# Patient Record
Sex: MALE | Race: WHITE
[De-identification: names, ages, dates, MRNs, and addresses within clinical notes are randomized per-mention and may not be internally consistent; named-entity substitution may affect disease eponyms.]

---

## 2017-12-02 ENCOUNTER — HOSPITAL ENCOUNTER (EMERGENCY)
Dept: HOSPITAL 17 - NEPC | Age: 67
Discharge: NURSING, REHAB OR LONG-TERM CARE | End: 2017-12-02
Payer: MEDICARE

## 2017-12-02 VITALS
TEMPERATURE: 98.6 F | DIASTOLIC BLOOD PRESSURE: 102 MMHG | RESPIRATION RATE: 16 BRPM | SYSTOLIC BLOOD PRESSURE: 180 MMHG | OXYGEN SATURATION: 99 % | HEART RATE: 93 BPM

## 2017-12-02 VITALS
RESPIRATION RATE: 16 BRPM | OXYGEN SATURATION: 97 % | SYSTOLIC BLOOD PRESSURE: 167 MMHG | HEART RATE: 94 BPM | DIASTOLIC BLOOD PRESSURE: 98 MMHG

## 2017-12-02 VITALS — BODY MASS INDEX: 24.39 KG/M2 | HEIGHT: 68 IN | WEIGHT: 160.94 LBS

## 2017-12-02 DIAGNOSIS — Z86.69: ICD-10-CM

## 2017-12-02 DIAGNOSIS — R10.9: Primary | ICD-10-CM

## 2017-12-02 DIAGNOSIS — Z87.19: ICD-10-CM

## 2017-12-02 DIAGNOSIS — N28.9: ICD-10-CM

## 2017-12-02 DIAGNOSIS — Z87.39: ICD-10-CM

## 2017-12-02 DIAGNOSIS — R11.10: ICD-10-CM

## 2017-12-02 DIAGNOSIS — F03.90: ICD-10-CM

## 2017-12-02 LAB
ALP SERPL-CCNC: 150 U/L (ref 45–117)
ALT SERPL-CCNC: 25 U/L (ref 12–78)
ANION GAP SERPL CALC-SCNC: 5 MEQ/L (ref 5–15)
AST SERPL-CCNC: 34 U/L (ref 15–37)
BASOPHILS # BLD AUTO: 0.1 TH/MM3 (ref 0–0.2)
BASOPHILS NFR BLD: 0.8 % (ref 0–2)
BILIRUB SERPL-MCNC: 0.1 MG/DL (ref 0.2–1)
BUN SERPL-MCNC: 21 MG/DL (ref 7–18)
CHLORIDE SERPL-SCNC: 106 MEQ/L (ref 98–107)
COLOR UR: (no result)
COMMENT (UR): (no result)
CULTURE IF INDICATED: (no result)
EOSINOPHIL # BLD: 0.2 TH/MM3 (ref 0–0.4)
EOSINOPHIL NFR BLD: 1.7 % (ref 0–4)
ERYTHROCYTE [DISTWIDTH] IN BLOOD BY AUTOMATED COUNT: 16.4 % (ref 11.6–17.2)
GFR SERPLBLD BASED ON 1.73 SQ M-ARVRAT: 40 ML/MIN (ref 89–?)
GLUCOSE UR STRIP-MCNC: (no result) MG/DL
HCO3 BLD-SCNC: 29.9 MEQ/L (ref 21–32)
HCT VFR BLD CALC: 37.4 % (ref 39–51)
HEMO FLAGS: (no result)
HGB UR QL STRIP: (no result)
KETONES UR STRIP-MCNC: (no result) MG/DL
LYMPHOCYTES # BLD AUTO: 2.3 TH/MM3 (ref 1–4.8)
LYMPHOCYTES NFR BLD AUTO: 23.7 % (ref 9–44)
MCH RBC QN AUTO: 29 PG (ref 27–34)
MCHC RBC AUTO-ENTMCNC: 32.7 % (ref 32–36)
MCV RBC AUTO: 88.7 FL (ref 80–100)
MONOCYTES NFR BLD: 12.3 % (ref 0–8)
MUCOUS THREADS #/AREA URNS LPF: (no result) /LPF
NEUTROPHILS # BLD AUTO: 6 TH/MM3 (ref 1.8–7.7)
NEUTROPHILS NFR BLD AUTO: 61.5 % (ref 16–70)
NITRITE UR QL STRIP: (no result)
PLATELET # BLD: 321 TH/MM3 (ref 150–450)
POTASSIUM SERPL-SCNC: 3.9 MEQ/L (ref 3.5–5.1)
RBC # BLD AUTO: 4.22 MIL/MM3 (ref 4.5–5.9)
SODIUM SERPL-SCNC: 141 MEQ/L (ref 136–145)
SP GR UR STRIP: 1.01 (ref 1–1.03)
WBC # BLD AUTO: 9.7 TH/MM3 (ref 4–11)

## 2017-12-02 PROCEDURE — 96360 HYDRATION IV INFUSION INIT: CPT

## 2017-12-02 PROCEDURE — 83690 ASSAY OF LIPASE: CPT

## 2017-12-02 PROCEDURE — 74000: CPT

## 2017-12-02 PROCEDURE — 96361 HYDRATE IV INFUSION ADD-ON: CPT

## 2017-12-02 PROCEDURE — 99285 EMERGENCY DEPT VISIT HI MDM: CPT

## 2017-12-02 PROCEDURE — 85025 COMPLETE CBC W/AUTO DIFF WBC: CPT

## 2017-12-02 PROCEDURE — 81001 URINALYSIS AUTO W/SCOPE: CPT

## 2017-12-02 PROCEDURE — 74176 CT ABD & PELVIS W/O CONTRAST: CPT

## 2017-12-02 PROCEDURE — 80053 COMPREHEN METABOLIC PANEL: CPT

## 2017-12-02 NOTE — RADRPT
EXAM DATE/TIME:  12/02/2017 03:34 

 

[facility] COMPARISON:     

CT ABDOMEN & PELVIS W/O CONTRAST, September 01, 2017, 21:57.

 

 

INDICATIONS :     

Right lower quadrant pain with nausea and vomiting. Evaluate for obstruction.

                  

 

ORAL CONTRAST:      

No oral contrast ingested.

                  

 

RADIATION DOSE:     

4.61 CTDIvol (mGy) 

 

 

MEDICAL HISTORY :     

None  

 

SURGICAL HISTORY :       

Left hip surgery. Gastric surgery.

 

ENCOUNTER:      

Initial

 

ACUITY:      

1 day

 

PAIN SCALE:      

5/10

 

LOCATION:       

Bilateral  abdomen

 

TECHNIQUE:     

Volumetric scanning of the abdomen and pelvis was performed.  Using automated exposure control and ad
justment of the mA and/or kV according to patient size, radiation dose was kept as low as reasonably 
achievable to obtain optimal diagnostic quality images.  DICOM format image data is available electro
nically for review and comparison.  

 

FINDINGS:     

 

LOWER LUNGS:     

There is atelectasis and likely trace fluid at the left lung base.

 

LIVER:     

Homogeneous density without lesion.  There is no dilation of the biliary tree.  There are multiple cl
ips in gallbladder fossa.

 

SPLEEN:     

Normal size without lesion.

 

PANCREAS:     

Within normal limits. 

 

KIDNEYS:     

Normal in size and shape.  There is no mass, stone, or hydronephrosis.

 

ADRENAL GLANDS:     

Within normal limits.

 

VASCULAR:     

There is no aortic aneurysm. There is moderate atherosclerotic disease.

 

BOWEL/MESENTERY:     

Patient is post esophagectomy with gastric pull up procedure. A portion of the distal transverse and 
splenic flexure of the colon is present within the left inferior hemithorax related to hernia. The sm
all bowel demonstrates no significant abnormality. There is no free air or free fluid.

 

ABDOMINAL WALL:     

Within normal limits.

 

RETROPERITONEUM:     

There is no lymphadenopathy.

 

BLADDER:     

No wall thickening or mass.

 

REPRODUCTIVE:     

Within normal limits.

 

INGUINAL:     

There is no lymphadenopathy or hernia.

 

MUSCULOSKELETAL:     

There is intramedullary morelia in the left proximal femur. No acute osseous abnormality is seen.

 

CONCLUSION:     

1. No acute finding is identified within the abdomen or pelvis. There are no findings to indicate obs
truction.

2. Postsurgical changes indicative of prior esophagectomy and gastric pull up procedure and stable he
rnia of the colon.

 

 

 

 John Loera MD on December 02, 2017 at 4:11           

Board Certified Radiologist.

 This report was verified electronically.

## 2017-12-02 NOTE — PD
HPI


Chief Complaint:  Abdominal Pain


Time Seen by Provider:  02:41


Travel History


International Travel<30 days:  No


Contact w/Intl Traveler<30days:  No


Traveled to known affect area:  No





History of Present Illness


HPI


68 y/o male presents with abdominal pain and vomiting of unknown duration.  The 

nursing home that he stays at ordered a x-ray today that showed a possible 

small bowel obstruction and he was sent here for further evaluation.  Patient 

confirms he has pain but cannot give me any other details and history is 

limited.





PFSH


Past Medical History


*** Narrative Medical


By records


Asthma:  No


Blood Disorders:  No


Anxiety:  No


Depression:  No


Heart Rhythm Problems:  No


Cancer:  No


Cardiovascular Problems:  No


High Cholesterol:  No


Chemotherapy:  No


Chest Pain:  No


Congestive Heart Failure:  No


COPD:  No


Dementia:  Yes


Diabetes:  No


Endocrine:  No


Gastrointestinal Disorders:  Yes (hx of gastric surgery)


Genitourinary:  No


Immune Disorder:  No


Musculoskeletal:  Yes (,RIGHT HIP REPLACEMENT IN APRIL)


Neurologic:  Yes (weak in legs)


Psychiatric:  Yes (dementia)


Reproductive:  No


Respiratory:  No


Radiation Therapy:  No


Sleep Apnea:  No


Thyroid Disease:  No





Past Surgical History


*** Narrative Surgical


By records


Abdominal Surgery:  Yes


Pacemaker:  No


Other Surgery:  Yes (gastric surgery)





Social History


Alcohol Use:  No


Tobacco Use:  No


Substance Use:  No





Allergies-Medications


(Allergen,Severity, Reaction):  


Coded Allergies:  


     Sulfa (Sulfonamide Antibiotics) (Unverified  Allergy, Unknown, 11/18/17)


     penicillin G (Unverified  Allergy, Unknown, 11/18/17)


     prochlorperazine (Unverified  Allergy, Unknown, 11/18/17)


Reported Meds & Prescriptions





Reported Meds & Active Scripts


Active


Doxycycline Hyclate 100 Mg Cap 100 Mg PO BID 6 Days


Bisac-Evac Supp (Bisacodyl) 10 Mg Supp 10 Mg RECTAL DAILY PRN


Aspirin Low Dose (Aspirin) 81 Mg Chew 81 Mg CHEW DAILY


Abilify (Aripiprazole) 2 Mg Tab 2 Mg PO DAILY@0600


Namenda (Memantine) 10 Mg Tab 10 Mg PO BID


Venlafaxine ER 24 HR (Venlafaxine HCl) 150 Mg Cap 150 Mg PO HS


Reported


Senna-Tabs (Sennosides) 8.6 Mg Tab 8.6 Mg PO BID


Vitamin D3 (Cholecalciferol) 1,000 Unit Cap 1,000 Units PO DAILY


Integra (Multi-Vit/Iron-B Comp-Vit C) Unknown Strength Cap Unknown Dose  








Review of Systems


ROS Limitations:  Poor Historian





Physical Exam


Exam Limitations:  Poor Historian


Narrative


GENERAL: Well-nourished, well-developed patient.


SKIN: Warm and dry.


HEAD: Normocephalic 


EYES: No injection or drainage. 


ENT: No nasal drainage noted. 


NECK: Supple, trachea midline.


CARDIOVASCULAR: Regular rate and rhythm 


RESPIRATORY: No increased effort. No accessory muscle use.


GASTROINTESTINAL: Abdomen diffusely tender, nondistended. 


NEUROLOGICAL: Awake, moves all extremities





Data


Data


Last Documented VS





Vital Signs








  Date Time  Temp Pulse Resp B/P (MAP) Pulse Ox O2 Delivery O2 Flow Rate FiO2


 


12/2/17 02:44 98.6 93 16 180/102 (128) 99   








Orders





 Orders


Complete Blood Count With Diff (12/2/17 02:41)


Comprehensive Metabolic Panel (12/2/17 02:41)


Lipase (12/2/17 02:41)


Iv Access Insert/Monitor (12/2/17 02:41)


Abdomen, Kub Only (12/2/17 )


Ct Abd/Pel W/O Iv Contrast (12/2/17 )


Urinalysis - C+S If Indicated (12/2/17 04:20)


Sodium Chlor 0.9% 1000 Ml Inj (Ns 1000 M (12/2/17 04:30)


Ed Discharge Order (12/2/17 05:29)





Labs





Laboratory Tests








Test


  12/2/17


02:45 12/2/17


05:00


 


White Blood Count 9.7 TH/MM3  


 


Red Blood Count 4.22 MIL/MM3  


 


Hemoglobin 12.2 GM/DL  


 


Hematocrit 37.4 %  


 


Mean Corpuscular Volume 88.7 FL  


 


Mean Corpuscular Hemoglobin 29.0 PG  


 


Mean Corpuscular Hemoglobin


Concent 32.7 % 


  


 


 


Red Cell Distribution Width 16.4 %  


 


Platelet Count 321 TH/MM3  


 


Mean Platelet Volume 6.8 FL  


 


Neutrophils (%) (Auto) 61.5 %  


 


Lymphocytes (%) (Auto) 23.7 %  


 


Monocytes (%) (Auto) 12.3 %  


 


Eosinophils (%) (Auto) 1.7 %  


 


Basophils (%) (Auto) 0.8 %  


 


Neutrophils # (Auto) 6.0 TH/MM3  


 


Lymphocytes # (Auto) 2.3 TH/MM3  


 


Monocytes # (Auto) 1.2 TH/MM3  


 


Eosinophils # (Auto) 0.2 TH/MM3  


 


Basophils # (Auto) 0.1 TH/MM3  


 


CBC Comment DIFF FINAL  


 


Differential Comment   


 


Blood Urea Nitrogen 21 MG/DL  


 


Creatinine 1.73 MG/DL  


 


Random Glucose 107 MG/DL  


 


Total Protein 7.2 GM/DL  


 


Albumin 3.2 GM/DL  


 


Calcium Level 8.9 MG/DL  


 


Alkaline Phosphatase 150 U/L  


 


Aspartate Amino Transf


(AST/SGOT) 34 U/L 


  


 


 


Alanine Aminotransferase


(ALT/SGPT) 25 U/L 


  


 


 


Total Bilirubin 0.1 MG/DL  


 


Sodium Level 141 MEQ/L  


 


Potassium Level 3.9 MEQ/L  


 


Chloride Level 106 MEQ/L  


 


Carbon Dioxide Level 29.9 MEQ/L  


 


Anion Gap 5 MEQ/L  


 


Estimat Glomerular Filtration


Rate 40 ML/MIN 


  


 


 


Lipase 191 U/L  


 


Urine Color  LIGHT-YELLOW 


 


Urine Turbidity  CLEAR 


 


Urine pH  7.0 


 


Urine Specific Gravity  1.009 


 


Urine Protein  TRACE mg/dL 


 


Urine Glucose (UA)  NEG mg/dL 


 


Urine Ketones  NEG mg/dL 


 


Urine Occult Blood  NEG 


 


Urine Nitrite  NEG 


 


Urine Bilirubin  NEG 


 


Urine Urobilinogen


  


  LESS THAN 2.0


MG/DL


 


Urine Leukocyte Esterase  NEG 


 


Urine WBC


  


  LESS THAN 1


/hpf


 


Urine Mucus  FEW /lpf 


 


Microscopic Urinalysis Comment


  


  CATH-CULT NOT


IND











MDM


Medical Decision Making


Medical Screen Exam Complete:  Yes


Emergency Medical Condition:  Yes


Medical Record Reviewed:  Yes (past history confirmed)


Interpretation(s)


CBC & BMP Diagram


12/2/17 02:45








Total Protein 7.2, Albumin 3.2 L, Calcium Level 8.9, Alkaline Phosphatase 150 H

, Aspartate Amino Transf (AST/SGOT) 34, Alanine Aminotransferase (ALT/SGPT) 25, 

Total Bilirubin 0.1 L








Last 24 hours Impressions








Abdomen X-Ray 12/2/17 0000 Signed





Impressions: 





 Service Date/Time:  Saturday, December 2, 2017 02:50 - CONCLUSION:  No acute 





 finding is identified.     John Loera MD 








Differential Diagnosis


Obstruction, ileus, pancreatitis, gastritis


Narrative Course


Will check blood work, x-ray and reevaluate





X-ray nondiagnostic will proceed with CT





Labs show renal insufficiencies, CT is nondiagnostic, will add on urinalysis





UA no signs of infection, given IV fluids, no emesis here, patient can follow-

up outpatient for repeat creatinine and will be given Zofran.





Diagnosis





 Primary Impression:  


 Abdominal pain


 Qualified Codes:  R10.9 - Unspecified abdominal pain


 Additional Impressions:  


 Vomiting


 Qualified Codes:  R11.10 - Vomiting, unspecified


 Renal insufficiency


Patient Instructions:  General Instructions





***Additional Instructions:  


return as needed, follow with primary monday, tylenol as needed, keep hydrated


***Med/Other Pt SpecificInfo:  No Change to Meds


Disposition:  03 DISCHARGE TO SNF


Condition:  Stable











Cristel Babcock MD Dec 2, 2017 04:19

## 2017-12-02 NOTE — RADRPT
EXAM DATE/TIME:  12/02/2017 02:50 

 

[facility] COMPARISON:     

CT ABDOMEN & PELVIS W/O CONTRAST, September 01, 2017, 21:57.  CHEST SINGLE AP, November 18, 2017, 9:1
8.

 

                     

INDICATIONS :     

Nausea and vomiting x 1 day

                     

 

MEDICAL HISTORY :     

Carcinoma, esophageal.          

 

SURGICAL HISTORY :        

Partial Gastrectomy

 

ENCOUNTER:     

Initial                                        

 

ACUITY:     

1 day      

 

PAIN SCORE:     

8/10

 

LOCATION:     

Bilateral  Abdomen

 

FINDINGS:     

2 frontal supine views of the abdomen demonstrate air within bowel in a nonobstructive pattern. No or
ganomegaly or concerning calcifications are identified. Clips overlie the superior abdomen and inferi
or chest. There is stable left lung base opacity related to a herniated colon and there is a small le
ft pleural effusion. The left proximal femur hardware is present.

 

CONCLUSION:     

No acute finding is identified.

 

 

 

 John Loera MD on December 02, 2017 at 3:07           

Board Certified Radiologist.

 This report was verified electronically.

## 2018-04-27 ENCOUNTER — HOSPITAL ENCOUNTER (OUTPATIENT)
Dept: HOSPITAL 17 - NEPE | Age: 68
Setting detail: OBSERVATION
LOS: 4 days | Discharge: NURSING, REHAB OR LONG-TERM CARE | End: 2018-05-01
Attending: HOSPITALIST | Admitting: HOSPITALIST
Payer: MEDICARE

## 2018-04-27 VITALS
OXYGEN SATURATION: 97 % | RESPIRATION RATE: 18 BRPM | HEART RATE: 76 BPM | SYSTOLIC BLOOD PRESSURE: 152 MMHG | DIASTOLIC BLOOD PRESSURE: 74 MMHG | TEMPERATURE: 98.7 F

## 2018-04-27 VITALS
SYSTOLIC BLOOD PRESSURE: 191 MMHG | TEMPERATURE: 97.9 F | RESPIRATION RATE: 15 BRPM | HEART RATE: 92 BPM | DIASTOLIC BLOOD PRESSURE: 99 MMHG | OXYGEN SATURATION: 98 %

## 2018-04-27 VITALS
RESPIRATION RATE: 19 BRPM | DIASTOLIC BLOOD PRESSURE: 106 MMHG | HEART RATE: 82 BPM | OXYGEN SATURATION: 99 % | SYSTOLIC BLOOD PRESSURE: 190 MMHG | TEMPERATURE: 98.4 F

## 2018-04-27 VITALS
RESPIRATION RATE: 14 BRPM | DIASTOLIC BLOOD PRESSURE: 92 MMHG | OXYGEN SATURATION: 97 % | HEART RATE: 84 BPM | SYSTOLIC BLOOD PRESSURE: 157 MMHG

## 2018-04-27 VITALS — BODY MASS INDEX: 25.25 KG/M2 | WEIGHT: 176.37 LBS | HEIGHT: 70 IN

## 2018-04-27 DIAGNOSIS — Z85.01: ICD-10-CM

## 2018-04-27 DIAGNOSIS — Z85.028: ICD-10-CM

## 2018-04-27 DIAGNOSIS — K20.9: ICD-10-CM

## 2018-04-27 DIAGNOSIS — K44.9: ICD-10-CM

## 2018-04-27 DIAGNOSIS — R47.02: ICD-10-CM

## 2018-04-27 DIAGNOSIS — N17.9: ICD-10-CM

## 2018-04-27 DIAGNOSIS — Z79.899: ICD-10-CM

## 2018-04-27 DIAGNOSIS — K92.2: Primary | ICD-10-CM

## 2018-04-27 DIAGNOSIS — F03.90: ICD-10-CM

## 2018-04-27 DIAGNOSIS — I12.9: ICD-10-CM

## 2018-04-27 DIAGNOSIS — D50.9: ICD-10-CM

## 2018-04-27 DIAGNOSIS — N18.9: ICD-10-CM

## 2018-04-27 DIAGNOSIS — K22.11: ICD-10-CM

## 2018-04-27 LAB
ALBUMIN SERPL-MCNC: 4.2 GM/DL (ref 3.4–5)
ALP SERPL-CCNC: 192 U/L (ref 45–117)
ALT SERPL-CCNC: 26 U/L (ref 12–78)
AST SERPL-CCNC: 33 U/L (ref 15–37)
BASOPHILS # BLD AUTO: 0.1 TH/MM3 (ref 0–0.2)
BASOPHILS NFR BLD: 1.2 % (ref 0–2)
BILIRUB SERPL-MCNC: 0.2 MG/DL (ref 0.2–1)
BUN SERPL-MCNC: 22 MG/DL (ref 7–18)
CALCIUM SERPL-MCNC: 9.7 MG/DL (ref 8.5–10.1)
CHLORIDE SERPL-SCNC: 100 MEQ/L (ref 98–107)
CREAT SERPL-MCNC: 1.92 MG/DL (ref 0.6–1.3)
EOSINOPHIL # BLD: 0.2 TH/MM3 (ref 0–0.4)
EOSINOPHIL NFR BLD: 3.3 % (ref 0–4)
ERYTHROCYTE [DISTWIDTH] IN BLOOD BY AUTOMATED COUNT: 18.2 % (ref 11.6–17.2)
GFR SERPLBLD BASED ON 1.73 SQ M-ARVRAT: 35 ML/MIN (ref 89–?)
GLUCOSE SERPL-MCNC: 101 MG/DL (ref 74–106)
HCO3 BLD-SCNC: 28.4 MEQ/L (ref 21–32)
HCT VFR BLD CALC: 39.3 % (ref 39–51)
HGB BLD-MCNC: 12.6 GM/DL (ref 13–17)
INR PPP: 1 RATIO
LYMPHOCYTES # BLD AUTO: 1.4 TH/MM3 (ref 1–4.8)
LYMPHOCYTES NFR BLD AUTO: 22.9 % (ref 9–44)
MCH RBC QN AUTO: 25.4 PG (ref 27–34)
MCHC RBC AUTO-ENTMCNC: 32.1 % (ref 32–36)
MCV RBC AUTO: 79 FL (ref 80–100)
MONOCYTE #: 0.8 TH/MM3 (ref 0–0.9)
MONOCYTES NFR BLD: 12 % (ref 0–8)
NEUTROPHILS # BLD AUTO: 3.8 TH/MM3 (ref 1.8–7.7)
NEUTROPHILS NFR BLD AUTO: 60.6 % (ref 16–70)
PLATELET # BLD: 408 TH/MM3 (ref 150–450)
PMV BLD AUTO: 7.5 FL (ref 7–11)
PROT SERPL-MCNC: 9 GM/DL (ref 6.4–8.2)
PROTHROMBIN TIME: 9.8 SEC (ref 9.8–11.6)
RBC # BLD AUTO: 4.98 MIL/MM3 (ref 4.5–5.9)
SODIUM SERPL-SCNC: 137 MEQ/L (ref 136–145)
WBC # BLD AUTO: 6.3 TH/MM3 (ref 4–11)

## 2018-04-27 PROCEDURE — 86850 RBC ANTIBODY SCREEN: CPT

## 2018-04-27 PROCEDURE — 81001 URINALYSIS AUTO W/SCOPE: CPT

## 2018-04-27 PROCEDURE — 96365 THER/PROPH/DIAG IV INF INIT: CPT

## 2018-04-27 PROCEDURE — 86901 BLOOD TYPING SEROLOGIC RH(D): CPT

## 2018-04-27 PROCEDURE — C9113 INJ PANTOPRAZOLE SODIUM, VIA: HCPCS

## 2018-04-27 PROCEDURE — 43270 EGD LESION ABLATION: CPT

## 2018-04-27 PROCEDURE — 85610 PROTHROMBIN TIME: CPT

## 2018-04-27 PROCEDURE — 82607 VITAMIN B-12: CPT

## 2018-04-27 PROCEDURE — 99285 EMERGENCY DEPT VISIT HI MDM: CPT

## 2018-04-27 PROCEDURE — 97163 PT EVAL HIGH COMPLEX 45 MIN: CPT

## 2018-04-27 PROCEDURE — 88312 SPECIAL STAINS GROUP 1: CPT

## 2018-04-27 PROCEDURE — 85025 COMPLETE CBC W/AUTO DIFF WBC: CPT

## 2018-04-27 PROCEDURE — 93970 EXTREMITY STUDY: CPT

## 2018-04-27 PROCEDURE — G0378 HOSPITAL OBSERVATION PER HR: HCPCS

## 2018-04-27 PROCEDURE — 86900 BLOOD TYPING SEROLOGIC ABO: CPT

## 2018-04-27 PROCEDURE — 80053 COMPREHEN METABOLIC PANEL: CPT

## 2018-04-27 PROCEDURE — 83540 ASSAY OF IRON: CPT

## 2018-04-27 PROCEDURE — 71045 X-RAY EXAM CHEST 1 VIEW: CPT

## 2018-04-27 PROCEDURE — 00731 ANES UPR GI NDSC PX NOS: CPT

## 2018-04-27 PROCEDURE — 80048 BASIC METABOLIC PNL TOTAL CA: CPT

## 2018-04-27 PROCEDURE — 96375 TX/PRO/DX INJ NEW DRUG ADDON: CPT

## 2018-04-27 PROCEDURE — 82746 ASSAY OF FOLIC ACID SERUM: CPT

## 2018-04-27 PROCEDURE — 85014 HEMATOCRIT: CPT

## 2018-04-27 PROCEDURE — 96361 HYDRATE IV INFUSION ADD-ON: CPT

## 2018-04-27 PROCEDURE — 96366 THER/PROPH/DIAG IV INF ADDON: CPT

## 2018-04-27 PROCEDURE — 88305 TISSUE EXAM BY PATHOLOGIST: CPT

## 2018-04-27 PROCEDURE — 85018 HEMOGLOBIN: CPT

## 2018-04-27 PROCEDURE — 83550 IRON BINDING TEST: CPT

## 2018-04-27 PROCEDURE — 82728 ASSAY OF FERRITIN: CPT

## 2018-04-27 PROCEDURE — 43239 EGD BIOPSY SINGLE/MULTIPLE: CPT

## 2018-04-27 PROCEDURE — 96372 THER/PROPH/DIAG INJ SC/IM: CPT

## 2018-04-27 PROCEDURE — 82150 ASSAY OF AMYLASE: CPT

## 2018-04-27 PROCEDURE — 83690 ASSAY OF LIPASE: CPT

## 2018-04-27 PROCEDURE — 43255 EGD CONTROL BLEEDING ANY: CPT

## 2018-04-27 PROCEDURE — 96376 TX/PRO/DX INJ SAME DRUG ADON: CPT

## 2018-04-27 RX ADMIN — PANTOPRAZOLE SODIUM SCH MLS/HR: 40 INJECTION, POWDER, FOR SOLUTION INTRAVENOUS at 19:31

## 2018-04-27 RX ADMIN — PHENYTOIN SODIUM SCH MLS/HR: 50 INJECTION INTRAMUSCULAR; INTRAVENOUS at 22:44

## 2018-04-27 NOTE — PD
HPI


Chief Complaint:  GI Complaint


Time Seen by Provider:  19:08


Travel History


International Travel<30 days:  No


Contact w/Intl Traveler<30days:  No


Traveled to known affect area:  No





History of Present Illness


HPI


pt  has  been vomiting all day he says ,  now  there is  bright red blood in 

his vomitus as well  here in the  ER , He also has epigastric pain  which  

coninues  in  ER  , He  seems slow  in his comprehension of my questions   I 

get different responses to  whether he has  liver disease or portal  htn  

history  ,  pt  is  awake alert but  some  what disoriented, etoh?  vs  

delirium  vs dementia  poor historian  HPI and ROS limited  due to  mental 

status .  On review of  his  past  charts --> I see pt  has  Hx  of  gastric  

cancer with  stomach resection and  prior  GI  bleed . IN ER HE  HAS  VOMITUS 

IN  BASIN WITH  BRIGHT RED BLOOD IN IT,





PFSH


Past Medical History


Asthma:  No


Blood Disorders:  No


Anxiety:  No


Depression:  No


Heart Rhythm Problems:  No


Cancer:  No


Cardiovascular Problems:  No


High Cholesterol:  No


Chemotherapy:  No


Chest Pain:  No


Congestive Heart Failure:  No


COPD:  No


Dementia:  Yes


Diabetes:  No


Endocrine:  No


Gastrointestinal Disorders:  Yes (hx of gastric surgery)


Genitourinary:  No


Immune Disorder:  No


Musculoskeletal:  Yes (,RIGHT HIP REPLACEMENT IN APRIL)


Neurologic:  Yes (weak in legs)


Psychiatric:  Yes (dementia)


Reproductive:  No


Respiratory:  No


Radiation Therapy:  No


Sleep Apnea:  No


Thyroid Disease:  No





Past Surgical History


Abdominal Surgery:  Yes


Pacemaker:  No


Other Surgery:  Yes (gastric surgery)





Social History


Alcohol Use:  No


Tobacco Use:  No


Substance Use:  No





Allergies-Medications


(Allergen,Severity, Reaction):  


Coded Allergies:  


     Sulfa (Sulfonamide Antibiotics) (Unverified  Allergy, Unknown, 11/18/17)


     penicillin G (Unverified  Allergy, Unknown, 11/18/17)


     prochlorperazine (Unverified  Allergy, Unknown, 11/18/17)


Reported Meds & Prescriptions





Reported Meds & Active Scripts


Active


Bisac-Evac Supp (Bisacodyl) 10 Mg Supp 10 Mg RECTAL DAILY PRN


Abilify (Aripiprazole) 2 Mg Tab 2 Mg PO DAILY@0600


Namenda (Memantine) 10 Mg Tab 10 Mg PO BID


Venlafaxine ER 24 HR (Venlafaxine HCl) 150 Mg Cap 150 Mg PO HS


Reported


Tylenol (Acetaminophen) 325 Mg Tab 650 Mg PO Q4H PRN


Aricept (Donepezil HCl) 10 Mg Tablet 10 Mg PO HS


Miralax Powder (Polyethylene Glycol 3350 Powder) 17 Gm Powd 17 Gm PO DAILY


     Mix and dissolve one measuring capful (17 grams) in 4oz of water or


     juice.


Colace (Docusate Sodium) 100 Mg Capsule 100 Mg PO BID


Magnesium Citrate Liq (Magnesium Citrate) 300 Ml Liq 300 Ml PO Q12HR PRN


Robitussin Cough-Chest Dm Liq (Guaifenesin/Dextromethorphan) 100 Mg-5 Mg/5 Ml 

Liquid 10 Ml PO Q6HR PRN


Almacone Liq (Aluminum-Magnesium-Simethicone Liq) 200-200-20 Mg/5 Ml Susp 15 Ml 

PO Q4HR PRN


     Take between meals or as directed. Shake well. Do not exceed 120 mL/24


     hrs.


Multi-Vitamin/Minerals (Multiple Vitamins W/ Minerals) 1 Tab Tab 1 Tab PO DAILY


Senna-Tabs (Sennosides) 8.6 Mg Tab 8.6 Mg PO BID


Vitamin D3 (Cholecalciferol) 1,000 Unit Cap 1,000 Units PO DAILY








Review of Systems


Except as stated in HPI:  all other systems reviewed are Neg


Gastrointestinal:  Positive: Vomiting, Abdominal Pain, Hematemesis





Physical Exam


Narrative


GENERAL:  seems slightly confused and poor historian 


SKIN: Warm and dry.


HEAD: Atraumatic. Normocephalic. 


EYES: Pupils equal and round. No scleral icterus. No injection or drainage. 


ENT: No nasal bleeding or discharge.  Mucous membranes pink and moist.


NECK: Trachea midline. No JVD. 


CARDIOVASCULAR: Regular rate and rhythm.  


RESPIRATORY: No accessory muscle use. Clear to auscultation. Breath sounds 

equal bilaterally. 


GASTROINTESTINAL: Abdomen  epigastric tenderness  nondistended. Hepatic and 

splenic margins not palpable.  Bright red blood in the vomitus in his basin 

bedside 


MUSCULOSKELETAL: Extremities without clubbing, cyanosis, or edema. No obvious 

deformities. 


NEUROLOGICAL: Awake and alert. No obvious cranial nerve deficits.  Motor 

grossly within normal limits. Five out of 5 muscle strength in the arms and 

legs.  Normal speech.


PSYCHIATRIC:  intellect seems slow .. possible  CP  or  dementia  or  prior TBI

  behavior  , unclear as to why  his mentation is  slow  almost  MR in nature





Data


Data


Last Documented VS





Orders





 Orders


Complete Blood Count With Diff (4/27/18 19:11)


Comprehensive Metabolic Panel (4/27/18 19:11)


Prothrombin Time / Inr (Pt) (4/27/18 19:11)


Amylase (4/27/18 19:11)


Lipase (4/27/18 19:11)


Chest, Single Ap (4/27/18 19:11)


Type And Screen (4/27/18 19:11)


Pantoprazole Inj (Protonix Inj) (4/27/18 19:30)


Pantoprazole Inj (Protonix Inj) (4/27/18 19:30)


Octreotide Inj (Sandostatin  Inj) (4/27/18 19:30)


Sodium Chlor 0.9% 1000 Ml Inj (Ns 1000 M (4/27/18 19:30)


Ondansetron Inj (Zofran Inj) (4/27/18 20:30)


Admit Order (Ed Use Only) (4/27/18 21:18)


Consult Gastroenterology (4/27/18 )


Place In Observation (4/27/18 )


Vital Signs (Adult) Q4H (4/27/18 21:14)


Activity Oob With Assistance (4/27/18 21:14)


Cardiac Monitor / Telemetry .CONTINUOUS (4/27/18 21:14)


Intake + Output SEA.QSHIFT (4/27/18 21:14)


Sodium Chlor 0.9% 1000 Ml Inj (Ns 1000 M (4/27/18 21:14)


Sodium Chloride 0.9% Flush (Ns Flush) (4/27/18 21:15)


Sodium Chloride 0.9% Flush (Ns Flush) (4/28/18 09:00)


Ondansetron Inj (Zofran Inj) (4/27/18 21:15)


Comprehensive Metabolic Panel (4/28/18 06:00)


Complete Blood Count With Diff (4/28/18 06:00)


Pt Request For Service (4/27/18 21:14)


Case Management Consult (4/27/18 21:14)


Pharmacologic Contraindication (4/27/18 21:14)


Acetaminophen (Tylenol) (4/27/18 21:15)


Acetamin-Hydrocod 325-5 Mg (Norco  5-325 (4/27/18 21:15)


Morphine Inj (Morphine Inj) (4/27/18 21:15)


Docusate Sodium-Senna (Ilsa-Colace) (4/28/18 09:00)


Magnesium Hydroxide Liq (Milk Of Magnesi (4/27/18 21:15)


Sennosides (Senokot) (4/27/18 21:15)


Bisacodyl Supp (Dulcolax Supp) (4/27/18 21:15)


Lactulose Liq (Lactulose Liq) (4/27/18 21:15)


Aripiprazole (Abilify) (4/28/18 06:00)


Memantine (Namenda) (4/28/18 09:00)


Venlafaxine Xr (Effexor Xr) (4/28/18 21:00)


Donepezil (Aricept) (4/28/18 21:00)





Labs





Laboratory Tests








Test


  4/27/18


19:10


 


White Blood Count 6.3 TH/MM3 


 


Red Blood Count 4.98 MIL/MM3 


 


Hemoglobin 12.6 GM/DL 


 


Hematocrit 39.3 % 


 


Mean Corpuscular Volume 79.0 FL 


 


Mean Corpuscular Hemoglobin 25.4 PG 


 


Mean Corpuscular Hemoglobin


Concent 32.1 % 


 


 


Red Cell Distribution Width 18.2 % 


 


Platelet Count 408 TH/MM3 


 


Mean Platelet Volume 7.5 FL 


 


Neutrophils (%) (Auto) 60.6 % 


 


Lymphocytes (%) (Auto) 22.9 % 


 


Monocytes (%) (Auto) 12.0 % 


 


Eosinophils (%) (Auto) 3.3 % 


 


Basophils (%) (Auto) 1.2 % 


 


Neutrophils # (Auto) 3.8 TH/MM3 


 


Lymphocytes # (Auto) 1.4 TH/MM3 


 


Monocytes # (Auto) 0.8 TH/MM3 


 


Eosinophils # (Auto) 0.2 TH/MM3 


 


Basophils # (Auto) 0.1 TH/MM3 


 


CBC Comment DIFF FINAL 


 


Differential Comment  


 


Prothrombin Time 9.8 SEC 


 


Prothromb Time International


Ratio 1.0 RATIO 


 


 


Blood Urea Nitrogen 22 MG/DL 


 


Creatinine 1.92 MG/DL 


 


Random Glucose 101 MG/DL 


 


Total Protein 9.0 GM/DL 


 


Albumin 4.2 GM/DL 


 


Calcium Level 9.7 MG/DL 


 


Alkaline Phosphatase 192 U/L 


 


Aspartate Amino Transf


(AST/SGOT) 33 U/L 


 


 


Alanine Aminotransferase


(ALT/SGPT) 26 U/L 


 


 


Total Bilirubin 0.2 MG/DL 


 


Sodium Level 137 MEQ/L 


 


Potassium Level 4.5 MEQ/L 


 


Chloride Level 100 MEQ/L 


 


Carbon Dioxide Level 28.4 MEQ/L 


 


Anion Gap 9 MEQ/L 


 


Estimat Glomerular Filtration


Rate 35 ML/MIN 


 


 


Amylase Level 139 U/L 


 


Lipase 205 U/L 











[facility]


Medical Decision Making


Medical Screen Exam Complete:  Yes


Emergency Medical Condition:  Yes


Differential Diagnosis


hematemsis of  variceal bleed  vs  gastritis  ulcer  in antrum  vs  duodenal 

ulcer bleed  other


Narrative Course


pt  is  given  protonix  IVP  and  Drip and  octreotide  push   ,  pt  has  

gastric cancer  hx and  resection and   history of  bleed  and  pt is  given  2 

liters  NS  and  admitted for  GI  intervention Mine  Hand H  stable and  safe 

for M/S  admit





Diagnosis





 Primary Impression:  


 Hematemesis


 Qualified Codes:  K92.0 - Hematemesis





Admitting Information


Admitting Physician Requests:  Admit


Scripts


Pantoprazole (Pantoprazole) 40 Mg Tab


40 MG PO DAILY for esophagitis, #30 TAB


   Prov: Yaa Yoo PA-C         5/1/18 


Amlodipine (Norvasc) 5 Mg Tab


5 MG PO DAILY for Blood Pressure Management, #30 TAB


   Prov: Yaa Yoo PA-C         5/1/18 


Ferrous Sulfate (Ferosul) 325 Mg (65 Mg Iron) Tablet


325 MG PO BID for Build Red Blood Cells for 30 Days, #60 TAB


   Prov: Yaa Yoo PA-C         5/1/18











Deep Live MD Apr 27, 2018 19:16

## 2018-04-27 NOTE — RADRPT
EXAM DATE/TIME:  04/27/2018 19:16 

 

[facility] COMPARISON:     

CT PULMONARY ANGIOGRAM, November 18, 2017, 9:42.  CHEST SINGLE AP, November 18, 2017, 9:18.

 

                     

INDICATIONS :     

Pt is vomiting with blood present. Pt gives limited responses. Pt pointed to his throat and abdomen w
hen asked where he has pain. Pt unable to state how long he has been vomitting.

                     

 

MEDICAL HISTORY :           

Carcinoma, esophageal.   

 

SURGICAL HISTORY :       

Partial Gastrectomy

 

ENCOUNTER:     

Initial                                        

 

ACUITY:     

1 day      

 

PAIN SCORE:     

Non-responsive.

 

LOCATION:     

Bilateral upper quadrant Throat

 

FINDINGS:     

A single view of the chest demonstrates the lungs to be symmetrically aerated without evidence of mas
s, infiltrate or effusion.  The cardiomediastinal contours are unremarkable.  Osseous structures are 
intact.

 

Changes of esophagectomy and gastric pull up again noted.

 

CONCLUSION:     

No acute abnormality demonstrated.

 

 

 

 John Jules MD on April 27, 2018 at 19:29           

Board Certified Radiologist.

 This report was verified electronically.

## 2018-04-27 NOTE — HHI.HP
__________________________________________________





HPI


Service


[facility]


Primary Care Physician


Clive Caldwell MD


Admission Diagnosis





Hematemesis


Diagnoses:  


(1) GI bleed


Diagnosis:  Principal





(2) HARRY (acute kidney injury)


Diagnosis:  Principal





(3) Dementia


Diagnosis:  Principal





Travel History


International Travel<30 Days:  No


Contact w/Intl Traveler <30 Da:  No


Traveled to Known Affected Are:  No


History of Present Illness


This is a 68-year-old male with a PMH of HTN, h/o Esophageal/Gastric CA s/p 

Gastrectomy and Dementia who was sent to the ER from [facility] for 

hematemesis.  Pt unable to provide any history due to severe dementia.  Per 

report, pt vomited bright red emesis earlier this afternoon.  Pt without 

complaints of abdominal pain.  On arrival, /106, HR 82, O2 sat 99% RA, 

Afebrile.  Hemoglobin 12.6, previously 12.2 on 17.  Creatinine 1.92, 

previously 1.73 on 17.  INR 1.0.  CXR with no acute findings.  Started on 

Protonix gtt in ER.





Review of Systems


Except as stated in HPI:  all other systems reviewed are Neg


ROS: 14 point review of systems otherwise negative.





Past Family Social History


Past Medical History


PMH:  HTN, h/o Esophageal/Gastric CA s/p Gastrectomy and Dementia


Past Surgical History


PAST SURGICAL HISTORY: Gastrectomy


Allergies:  


Coded Allergies:  


     Sulfa (Sulfonamide Antibiotics) (Unverified  Allergy, Unknown, 17)


     penicillin G (Unverified  Allergy, Unknown, 17)


     prochlorperazine (Unverified  Allergy, Unknown, 17)


Family History


PAST FAMILY HISTORY:  Reviewed.  No h/o DM or CAD


Social History


PAST SOCIAL HISTORY: Negative for alcohol, tobacco or drugs.





Physical Exam


Vital Signs





Vital Signs








  Date Time  Temp Pulse Resp B/P (MAP) Pulse Ox O2 Delivery O2 Flow Rate FiO2


 


18 20:42  84 14 157/92 (113) 97 Room Air  


 


18 19:10 97.9 92 15 191/99 (129) 98 Room Air  


 


18 18:38 98.4 82 19 190/106 (134) 99   








Physical Exam


PE:


GENERAL: Pleasantly demented middle-aged white male in no acute distress.


HEENT: PERRLA, EOMI. No scleral icterus or conjunctival pallor. No lid lag or 

facial droop.  


CARDIOVASCULAR: Regular rate and rhythm.  No obvious murmurs to auscultation. 

No chest tenderness to palpation. 


RESPIRATORY: No obvious rhonchi or wheezing. Clear to auscultation. Breath 

sounds equal bilaterally. 


GASTROINTESTINAL: Abdomen soft, non-tender, nondistended. BS normal. 


MUSCULOSKELETAL: Extremities without clubbing, cyanosis, or edema. No obvious 

deformities. 


NEUROLOGICAL: Awake, alert and oriented to person, place, confused.  No focal 

neurologic deficits. Moving both upper and lower extremities spontaneously.


Laboratory





Laboratory Tests








Test


  18


19:10


 


White Blood Count 6.3 


 


Red Blood Count 4.98 


 


Hemoglobin 12.6 


 


Hematocrit 39.3 


 


Mean Corpuscular Volume 79.0 


 


Mean Corpuscular Hemoglobin 25.4 


 


Mean Corpuscular Hemoglobin


Concent 32.1 


 


 


Red Cell Distribution Width 18.2 


 


Platelet Count 408 


 


Mean Platelet Volume 7.5 


 


Neutrophils (%) (Auto) 60.6 


 


Lymphocytes (%) (Auto) 22.9 


 


Monocytes (%) (Auto) 12.0 


 


Eosinophils (%) (Auto) 3.3 


 


Basophils (%) (Auto) 1.2 


 


Neutrophils # (Auto) 3.8 


 


Lymphocytes # (Auto) 1.4 


 


Monocytes # (Auto) 0.8 


 


Eosinophils # (Auto) 0.2 


 


Basophils # (Auto) 0.1 


 


CBC Comment DIFF FINAL 


 


Differential Comment  


 


Prothrombin Time 9.8 


 


Prothromb Time International


Ratio 1.0 


 


 


Blood Urea Nitrogen 22 


 


Creatinine 1.92 


 


Random Glucose 101 


 


Total Protein 9.0 


 


Albumin 4.2 


 


Calcium Level 9.7 


 


Alkaline Phosphatase 192 


 


Aspartate Amino Transf


(AST/SGOT) 33 


 


 


Alanine Aminotransferase


(ALT/SGPT) 26 


 


 


Total Bilirubin 0.2 


 


Sodium Level 137 


 


Potassium Level 4.5 


 


Chloride Level 100 


 


Carbon Dioxide Level 28.4 


 


Anion Gap 9 


 


Estimat Glomerular Filtration


Rate 35 


 


 


Amylase Level 139 


 


Lipase 205 








Result Diagram:  


18








Caprini VTE Risk Assessment


Caprini VTE Risk Assessment:  No/Low Risk (score <= 1)


Caprini Risk Assessment Model











 Point Value = 1          Point Value = 2  Point Value = 3  Point Value = 5


 


Age 41-60


Minor surgery


BMI > 25 kg/m2


Swollen legs


Varicose veins


Pregnancy or postpartum


History of unexplained or recurrent


   spontaneous 


Oral contraceptives or hormone


   replacement


Sepsis (< 1 month)


Serious lung disease, including


   pneumonia (< 1 month)


Abnormal pulmonary function


Acute myocardial infarction


Congestive heart failure (< 1 month)


History of inflammatory bowel disease


Medical patient at bed rest Age 61-74


Arthroscopic surgery


Major open surgery (> 45 min)


Laparoscopic surgery (> 45 min)


Malignancy


Confined to bed (> 72 hours)


Immobilizing plaster cast


Central venous access Age >= 75


History of VTE


Family history of VTE


Factor V Leiden


Prothrombin 11717X


Lupus anticoagulant


Anticardiolipin antibodies


Elevated serum homocysteine


Heparin-induced thrombocytopenia


Other congenital or acquired


   thrombophilia Stroke (< 1 month)


Elective arthroplasty


Hip, pelvis, or leg fracture


Acute spinal cord injury (< 1 month)








Prophylaxis Regimen











   Total Risk


Factor Score Risk Level Prophylaxis Regimen


 


0-1      Low Early ambulation


 


2 Moderate Order ONE of the following:


*Sequential Compression Device (SCD)


*Heparin 5000 units SQ BID


 


3-4 Higher Order ONE of the following medications:


*Heparin 5000 units SQ TID


*Enoxaparin/Lovenox 40 mg SQ daily (WT < 150 kg, CrCl > 30 mL/min)


*Enoxaparin/Lovenox 30 mg SQ daily (WT < 150 kg, CrCl > 10-29 mL/min)


*Enoxaparin/Lovenox 30 mg SQ BID (WT < 150 kg, CrCl > 30 mL/min)


AND/OR


*Sequential Compression Device (SCD)


 


5 or more Highest Order ONE of the following medications:


*Heparin 5000 units SQ TID (Preferred with Epidurals)


*Enoxaparin/Lovenox 40 mg SQ daily (WT < 150 kg, CrCl > 30 mL/min)


*Enoxaparin/Lovenox 30 mg SQ daily (WT < 150 kg, CrCl > 10-29 mL/min)


*Enoxaparin/Lovenox 30 mg SQ BID (WT < 150 kg, CrCl > 30 mL/min)


AND


*Sequential Compression Device (SCD)











Assessment and Plan


Problem List:  


(1) GI bleed


ICD Code:  K92.2 - Gastrointestinal hemorrhage, unspecified


(2) HARRY (acute kidney injury)


ICD Code:  N17.9 - Acute kidney failure, unspecified


(3) Dementia


ICD Code:  F03.90 - Unspecified dementia without behavioral disturbance


Status:  Chronic


Assessment and Plan


A/P:


1.  GI Bleed:  report of hematemesis from SNF, h/o Esophageal/Gastric CA s/p 

Gastrectomy per records, Hgb 12.6, previously 12.2.  Hold ASA.  Check Hgb in 

am.  Consult GI for further eval/intervention.  Transfuse as needed for Hgb <7 

or if hemodynamically stable.  Continue Protonix gtt


2.  HARRY:  Creatinine 1.92, previously 1.73 on 2017.  Check UA, IVF for 

hydration, repeat labs in a.m.


3.  Dementia:  Severe.  At baseline.  Resume home Aricept, Namenda, Abilify


4.  DVT Prophylaxis:  Pharmacologic contraindication secondary to GI Bleed


5.  Social work for DC planning as needed.


6.  Case discussed at length with the ER physician, lab/record/imaging reviewed 

by me.











Noreen Salazar MD 2018 21:19

## 2018-04-28 VITALS
RESPIRATION RATE: 18 BRPM | HEART RATE: 80 BPM | TEMPERATURE: 98.6 F | OXYGEN SATURATION: 98 % | DIASTOLIC BLOOD PRESSURE: 91 MMHG | SYSTOLIC BLOOD PRESSURE: 195 MMHG

## 2018-04-28 VITALS — HEART RATE: 67 BPM

## 2018-04-28 VITALS
RESPIRATION RATE: 18 BRPM | HEART RATE: 60 BPM | SYSTOLIC BLOOD PRESSURE: 123 MMHG | DIASTOLIC BLOOD PRESSURE: 64 MMHG | TEMPERATURE: 98.7 F | OXYGEN SATURATION: 97 %

## 2018-04-28 VITALS
HEART RATE: 70 BPM | SYSTOLIC BLOOD PRESSURE: 121 MMHG | DIASTOLIC BLOOD PRESSURE: 58 MMHG | RESPIRATION RATE: 18 BRPM | OXYGEN SATURATION: 93 % | TEMPERATURE: 98 F

## 2018-04-28 VITALS
TEMPERATURE: 97.8 F | OXYGEN SATURATION: 98 % | DIASTOLIC BLOOD PRESSURE: 61 MMHG | SYSTOLIC BLOOD PRESSURE: 118 MMHG | RESPIRATION RATE: 18 BRPM | HEART RATE: 63 BPM

## 2018-04-28 VITALS — HEART RATE: 74 BPM

## 2018-04-28 VITALS
DIASTOLIC BLOOD PRESSURE: 60 MMHG | SYSTOLIC BLOOD PRESSURE: 119 MMHG | TEMPERATURE: 98.6 F | OXYGEN SATURATION: 96 % | RESPIRATION RATE: 16 BRPM | HEART RATE: 61 BPM

## 2018-04-28 VITALS — SYSTOLIC BLOOD PRESSURE: 158 MMHG | RESPIRATION RATE: 18 BRPM | DIASTOLIC BLOOD PRESSURE: 68 MMHG

## 2018-04-28 VITALS — HEART RATE: 71 BPM | DIASTOLIC BLOOD PRESSURE: 70 MMHG | SYSTOLIC BLOOD PRESSURE: 145 MMHG

## 2018-04-28 VITALS — HEART RATE: 60 BPM

## 2018-04-28 LAB
ALBUMIN SERPL-MCNC: 3 GM/DL (ref 3.4–5)
ALP SERPL-CCNC: 155 U/L (ref 45–117)
ALT SERPL-CCNC: 20 U/L (ref 12–78)
AST SERPL-CCNC: 32 U/L (ref 15–37)
BASOPHILS # BLD AUTO: 0.1 TH/MM3 (ref 0–0.2)
BASOPHILS NFR BLD: 1.4 % (ref 0–2)
BILIRUB SERPL-MCNC: 0.3 MG/DL (ref 0.2–1)
BUN SERPL-MCNC: 17 MG/DL (ref 7–18)
CALCIUM SERPL-MCNC: 8.9 MG/DL (ref 8.5–10.1)
CHLORIDE SERPL-SCNC: 109 MEQ/L (ref 98–107)
COLOR UR: (no result)
CREAT SERPL-MCNC: 1.6 MG/DL (ref 0.6–1.3)
EOSINOPHIL # BLD: 0.2 TH/MM3 (ref 0–0.4)
EOSINOPHIL NFR BLD: 2.1 % (ref 0–4)
ERYTHROCYTE [DISTWIDTH] IN BLOOD BY AUTOMATED COUNT: 17.5 % (ref 11.6–17.2)
GFR SERPLBLD BASED ON 1.73 SQ M-ARVRAT: 43 ML/MIN (ref 89–?)
GLUCOSE SERPL-MCNC: 87 MG/DL (ref 74–106)
GLUCOSE UR STRIP-MCNC: (no result) MG/DL
HCO3 BLD-SCNC: 22.8 MEQ/L (ref 21–32)
HCT VFR BLD CALC: 33 % (ref 39–51)
HCT VFR BLD CALC: 34.1 % (ref 39–51)
HGB BLD-MCNC: 10.2 GM/DL (ref 13–17)
HGB BLD-MCNC: 10.7 GM/DL (ref 13–17)
HGB UR QL STRIP: (no result)
KETONES UR STRIP-MCNC: (no result) MG/DL
LYMPHOCYTES # BLD AUTO: 1.9 TH/MM3 (ref 1–4.8)
LYMPHOCYTES NFR BLD AUTO: 23.8 % (ref 9–44)
MCH RBC QN AUTO: 25.2 PG (ref 27–34)
MCHC RBC AUTO-ENTMCNC: 31.5 % (ref 32–36)
MCV RBC AUTO: 80.2 FL (ref 80–100)
MONOCYTE #: 1.2 TH/MM3 (ref 0–0.9)
MONOCYTES NFR BLD: 15.5 % (ref 0–8)
MUCOUS THREADS #/AREA URNS LPF: (no result) /LPF
NEUTROPHILS # BLD AUTO: 4.5 TH/MM3 (ref 1.8–7.7)
NEUTROPHILS NFR BLD AUTO: 57.2 % (ref 16–70)
NITRITE UR QL STRIP: (no result)
PLATELET # BLD: 336 TH/MM3 (ref 150–450)
PMV BLD AUTO: 7.3 FL (ref 7–11)
PROT SERPL-MCNC: 7 GM/DL (ref 6.4–8.2)
RBC # BLD AUTO: 4.25 MIL/MM3 (ref 4.5–5.9)
SODIUM SERPL-SCNC: 142 MEQ/L (ref 136–145)
SP GR UR STRIP: 1.01 (ref 1–1.03)
URINE LEUKOCYTE ESTERASE: (no result)
WBC # BLD AUTO: 7.8 TH/MM3 (ref 4–11)

## 2018-04-28 RX ADMIN — Medication SCH ML: at 08:48

## 2018-04-28 RX ADMIN — DONEPEZIL HYDROCHLORIDE SCH MG: 5 TABLET, FILM COATED ORAL at 21:07

## 2018-04-28 RX ADMIN — PHENYTOIN SODIUM SCH MLS/HR: 50 INJECTION INTRAMUSCULAR; INTRAVENOUS at 08:48

## 2018-04-28 RX ADMIN — PHENYTOIN SODIUM SCH MLS/HR: 50 INJECTION INTRAMUSCULAR; INTRAVENOUS at 22:22

## 2018-04-28 RX ADMIN — VENLAFAXINE HYDROCHLORIDE SCH MG: 75 CAPSULE, EXTENDED RELEASE ORAL at 21:07

## 2018-04-28 RX ADMIN — PANTOPRAZOLE SODIUM SCH MLS/HR: 40 INJECTION, POWDER, FOR SOLUTION INTRAVENOUS at 06:16

## 2018-04-28 RX ADMIN — STANDARDIZED SENNA CONCENTRATE AND DOCUSATE SODIUM SCH TAB: 8.6; 5 TABLET, FILM COATED ORAL at 21:07

## 2018-04-28 RX ADMIN — STANDARDIZED SENNA CONCENTRATE AND DOCUSATE SODIUM SCH TAB: 8.6; 5 TABLET, FILM COATED ORAL at 08:49

## 2018-04-28 RX ADMIN — Medication SCH ML: at 21:00

## 2018-04-28 RX ADMIN — MEMANTINE HYDROCHLORIDE SCH MG: 10 TABLET ORAL at 08:48

## 2018-04-28 RX ADMIN — PANTOPRAZOLE SODIUM SCH MLS/HR: 40 INJECTION, POWDER, FOR SOLUTION INTRAVENOUS at 21:07

## 2018-04-28 RX ADMIN — MEMANTINE HYDROCHLORIDE SCH MG: 10 TABLET ORAL at 21:09

## 2018-04-28 NOTE — PD.CONS
HPI


History of Present Illness


This is a 68 year old who was admitted on 4/27/2018 with hematemesis earlier in 

the afternoon day of admission.  Patient also notes dysphasia for the past few 

weeks and has not been able to eat or drink anything.  Patient notes some 

nausea and vomiting, no obvious chills or fever.  Patient denies any type of 

abdominal pain and no obvious rectal bleeding.  Patient has a substantial 

history of esophageal and gastric cancer and he is status post gastrectomy.  

Current hemoglobin was 12.6 on admission and now 10.7.  Patient was started on 

Protonix drip in the emergency room setting and is currently n.p.o. GI was 

consulted for possible GI bleed.  Limited information secondary to patient's 

dementia but did whisper his complaint of dysphasia.


 (Minna Bishop)





PFSH


Past Medical History


PMH:  HTN, h/o Esophageal/Gastric CA s/p Gastrectomy and Dementia


Past Surgical History


PAST SURGICAL HISTORY: Gastrectomy


 (Minna Bishop)


Coded Allergies:  


     Sulfa (Sulfonamide Antibiotics) (Unverified  Allergy, Unknown, 11/18/17)


     penicillin G (Unverified  Allergy, Unknown, 11/18/17)


     prochlorperazine (Unverified  Allergy, Unknown, 11/18/17)


Medications





Administered Medications








 Medications


  (Trade)  Dose


 Ordered  Sig/Mary


 Route


 PRN Reason  Start Time


 Stop Time Status Last Admin


Dose Admin


 


 Pantoprazole


 Sodium 80 mg/


 Sodium Chloride  100 ml @ 


 10 mls/hr  CONTINUOUS


 IV


   4/27/18 19:30


    4/28/18 06:16


 


 


 Octreotide Acetate


  (SandoSTATIN


 INJ)  50 mcg  ONCE


 IV PUSH


   4/27/18 19:30


    4/27/18 19:53


 


 


 Sodium Chloride  1,000 ml @ 


 100 mls/hr  Q10H


 IV


   4/27/18 21:14


    4/28/18 08:48


 


 


 Morphine Sulfate


  (Morphine Inj)  2 mg  Q3H  PRN


 IV PUSH


 Pain 6-10  4/27/18 21:15


    4/28/18 04:29


 


 


 Senna/Docusate


 Sodium


  (Ilsa-Colace)  1 tab  BID


 PO


   4/28/18 09:00


    4/28/18 08:49


 


 


 Aripiprazole


  (Abilify)  2 mg  DAILY@0600


 PO


   4/28/18 06:00


    4/28/18 06:16


 


 


 Memantine


  (Namenda)  10 mg  BID


 PO


   4/28/18 09:00


    4/28/18 08:48


 








Family History


PAST FAMILY HISTORY:  Reviewed.  No h/o DM or CAD


Social History


PAST SOCIAL HISTORY: Negative for alcohol, tobacco or drugs.


 (Minna Bishop)





Review of Systems


Gastrointestinal:  COMPLAINS OF: Nausea, Vomiting, Difficulty Swallowing, 

Hematemesis (Minna Bishop)





GI Exam


Vitals I&O





Vital Signs








  Date Time  Temp Pulse Resp B/P (MAP) Pulse Ox O2 Delivery O2 Flow Rate FiO2


 


4/28/18 12:44  71  145/70 (95)    


 


4/28/18 11:55 98.7 60 18 123/64 (83) 97   


 


4/28/18 08:05 97.8 63 18 118/61 (80) 98   


 


4/28/18 07:15  60      


 


4/28/18 04:46   18     





    158/68 (98)    


 


4/28/18 04:21 98.6 80 18 195/91 (125) 98   


 


4/28/18 03:06  74      


 


4/27/18 22:26 98.7 76 18 152/74 (100) 97   


 


4/27/18 21:52        


 


4/27/18 20:42  84 14 157/92 (113) 97 Room Air  


 


4/27/18 19:10 97.9 92 15 191/99 (129) 98 Room Air  


 


4/27/18 18:38 98.4 82 19 190/106 (134) 99   














I/O      


 


 4/27/18 4/27/18 4/27/18 4/28/18 4/28/18 4/28/18





 07:00 15:00 23:00 07:00 15:00 23:00


 


Intake Total   1000 ml   


 


Balance   1000 ml   


 


      


 


Intake IV Total   1000 ml   








Imaging





Last Impressions








Chest X-Ray 4/27/18 1911 Signed





Impressions: 





 Service Date/Time:  Friday, April 27, 2018 19:16 - CONCLUSION:  No acute 





 abnormality demonstrated.     John Jules MD 








Laboratory











Test


  4/27/18


19:10 4/28/18


04:30 4/28/18


06:40


 


White Blood Count 6.3 TH/MM3   7.8 TH/MM3 


 


Red Blood Count 4.98 MIL/MM3   4.25 MIL/MM3 


 


Hemoglobin 12.6 GM/DL   10.7 GM/DL 


 


Hematocrit 39.3 %   34.1 % 


 


Mean Corpuscular Volume 79.0 FL   80.2 FL 


 


Mean Corpuscular Hemoglobin 25.4 PG   25.2 PG 


 


Mean Corpuscular Hemoglobin


Concent 32.1 % 


  


  31.5 % 


 


 


Red Cell Distribution Width 18.2 %   17.5 % 


 


Platelet Count 408 TH/MM3   336 TH/MM3 


 


Mean Platelet Volume 7.5 FL   7.3 FL 


 


Neutrophils (%) (Auto) 60.6 %   57.2 % 


 


Lymphocytes (%) (Auto) 22.9 %   23.8 % 


 


Monocytes (%) (Auto) 12.0 %   15.5 % 


 


Eosinophils (%) (Auto) 3.3 %   2.1 % 


 


Basophils (%) (Auto) 1.2 %   1.4 % 


 


Neutrophils # (Auto) 3.8 TH/MM3   4.5 TH/MM3 


 


Lymphocytes # (Auto) 1.4 TH/MM3   1.9 TH/MM3 


 


Monocytes # (Auto) 0.8 TH/MM3   1.2 TH/MM3 


 


Eosinophils # (Auto) 0.2 TH/MM3   0.2 TH/MM3 


 


Basophils # (Auto) 0.1 TH/MM3   0.1 TH/MM3 


 


CBC Comment DIFF FINAL   DIFF FINAL 


 


Differential Comment     


 


Prothrombin Time 9.8 SEC   


 


Prothromb Time International


Ratio 1.0 RATIO 


  


  


 


 


Blood Urea Nitrogen 22 MG/DL   17 MG/DL 


 


Creatinine 1.92 MG/DL   1.60 MG/DL 


 


Random Glucose 101 MG/DL   87 MG/DL 


 


Total Protein 9.0 GM/DL   7.0 GM/DL 


 


Albumin 4.2 GM/DL   3.0 GM/DL 


 


Calcium Level 9.7 MG/DL   8.9 MG/DL 


 


Alkaline Phosphatase 192 U/L   155 U/L 


 


Aspartate Amino Transf


(AST/SGOT) 33 U/L 


  


  32 U/L 


 


 


Alanine Aminotransferase


(ALT/SGPT) 26 U/L 


  


  20 U/L 


 


 


Total Bilirubin 0.2 MG/DL   0.3 MG/DL 


 


Sodium Level 137 MEQ/L   142 MEQ/L 


 


Potassium Level 4.5 MEQ/L   4.7 MEQ/L 


 


Chloride Level 100 MEQ/L   109 MEQ/L 


 


Carbon Dioxide Level 28.4 MEQ/L   22.8 MEQ/L 


 


Anion Gap 9 MEQ/L   10 MEQ/L 


 


Estimat Glomerular Filtration


Rate 35 ML/MIN 


  


  43 ML/MIN 


 


 


Amylase Level 139 U/L   


 


Lipase 205 U/L   


 


Urine Color  LIGHT-YELLOW  


 


Urine Turbidity  CLEAR  


 


Urine pH  5.5  


 


Urine Specific Gravity  1.009  


 


Urine Protein  TRACE mg/dL  


 


Urine Glucose (UA)  NEG mg/dL  


 


Urine Ketones  NEG mg/dL  


 


Urine Occult Blood  NEG  


 


Urine Nitrite  NEG  


 


Urine Bilirubin  NEG  


 


Urine Urobilinogen


  


  LESS THAN 2.0


MG/DL 


 


 


Urine Leukocyte Esterase  NEG  


 


Urine WBC


  


  LESS THAN 1


/hpf 


 


 


Urine Mucus  FEW /lpf  


 


Microscopic Urinalysis Comment


  


  CULT NOT


INDICATED 


 








Physical Examination


HEENT: normocephalic; atraumatic; no jaundice.  Pale facial color


NECK: Neck supple, slim


CHEST: No audible rhonchi but some mild diminished breath sounds especially in 

the bases


CARDIAC: S1-S2


ABDOMEN: Flat, soft , nondistended, nontender; no palpable hepatosplenomegaly; 

bowel sounds are present in all four quadrants.


EXTREMITIES: No lower extremity edema.


SKIN:  Normal; no rash; no jaundice.,  Pale


CNS: Unable to provide a lot of information secondary to dementia, whispers


 (Minna Bishop)





Assessment and Plan


Assessment:  


(1) GI bleed


ICD Codes:  K92.2 - Gastrointestinal hemorrhage, unspecified


Plan


Dysphasia, 68-year-old male with history of esophageal cancer and gastrectomy 

complicated with dementia here for hematemesis.  Currently patient has not been 

able to drink or eat for the past few weeks and does complain of nausea and 

vomiting and hematemesis.  started on Protonix drip current hemoglobin was 12.6 

on admission now 10.7.  Alkaline phosphatase was 192 now 155.  No family 

present.  No complaints of abdominal pain no rectal bleeding to his knowledge.





Plan


N.p.o. EGD today with Dr. Maxwell.


Monitor labs with special attention to hemoglobin


Continue Protonix drip


Supportive care


Further recommendations will be based on findings





Patient was seen per myself and Dr. Maxwell, this note was written on his 

behalf


 (Minna Bishop)


Plan


Patient was seen and examined, agree with above note, history of esophageal 

cancer that was supposedly treated completely in 2012 with remission, patient 

came with some vomiting and hematemesis, we will plan on doing upper endoscopy 

today to rule out active bleeding and evaluate the reason for any bleed, I had 

a discussion with his sister who is the power of  and she agreed for 

him to have the procedure and a verbal consent over the phone was called and 

signed, meanwhile will monitor his hemoglobin to ensure that it is stable and 

give him packed RBC if needed


 (Caity Maxwell MD)











Mnina Bishop Apr 28, 2018 14:35


Caity Maxwell MD Apr 28, 2018 15:14

## 2018-04-28 NOTE — HHI.PR
Subjective


Remarks


Follow up on patient with GIB.  Patient seen and examined.  Patient is 

pleasantly confused.  Discussed with patients nurse Krystal, 2 episodes of 

hematemesis last night.  None today.  He has not had any bowel movements.  

Patient reports shortness of breath but is satting 97% on room air.  He denies 

any nausea or vomiting.  He reports some abdominal pain.





Objective


Vitals





Vital Signs








  Date Time  Temp Pulse Resp B/P (MAP) Pulse Ox O2 Delivery O2 Flow Rate FiO2


 


4/28/18 12:44  71  145/70 (95)    


 


4/28/18 11:55 98.7 60 18 123/64 (83) 97   


 


4/28/18 08:05 97.8 63 18 118/61 (80) 98   


 


4/28/18 07:15  60      


 


4/28/18 04:46   18     





    158/68 (98)    


 


4/28/18 04:21 98.6 80 18 195/91 (125) 98   


 


4/28/18 03:06  74      


 


4/27/18 22:26 98.7 76 18 152/74 (100) 97   


 


4/27/18 21:52        


 


4/27/18 20:42  84 14 157/92 (113) 97 Room Air  


 


4/27/18 19:10 97.9 92 15 191/99 (129) 98 Room Air  


 


4/27/18 18:38 98.4 82 19 190/106 (134) 99   














I/O      


 


 4/27/18 4/27/18 4/27/18 4/28/18 4/28/18 4/28/18





 07:00 15:00 23:00 07:00 15:00 23:00


 


Intake Total   1000 ml   


 


Balance   1000 ml   


 


      


 


Intake IV Total   1000 ml   








Result Diagram:  


4/28/18 0640                                                                   

             4/28/18 0640





Imaging





Last Impressions








Chest X-Ray 4/27/18 1911 Signed





Impressions: 





 Service Date/Time:  Friday, April 27, 2018 19:16 - CONCLUSION:  No acute 





 abnormality demonstrated.     John Jules MD 








Objective Remarks


GENERAL: WDWN pleasantly demented middle-aged white male patient, in no acute 

distress.  Asleep but easily awakens to voice.  Appears comfortable.  

Pleasantly confused.


HEENT: PERRLA, EOMI. No scleral icterus or conjunctival pallor. No lid lag or 

facial droop.  No nasal drainage or purulence.  MMM.


CARDIOVASCULAR: Regular rate and rhythm.  No obvious murmurs to auscultation. 

No chest tenderness to palpation. 


RESPIRATORY: Nonlabored.  No obvious rhonchi or wheezing. Clear to 

auscultation. Breath sounds equal bilaterally. 


GASTROINTESTINAL: Abdomen soft, non-tender, nondistended. BS normal. 


MUSCULOSKELETAL: Extremities without clubbing, cyanosis, or edema. No obvious 

deformities. 


NEUROLOGICAL: Awake, alert and oriented to self only, confused.  Moving both 

upper and lower extremities spontaneously.  No focal neurologic deficits.


PSYCHIATRIC:  Calm and pleasant.  Cooperative with exam.


Medications and IVs





Current Medications








 Medications


  (Trade)  Dose


 Ordered  Sig/Mary


 Route  Start Time


 Stop Time Status Last Admin


 


 Pantoprazole


 Sodium 80 mg/


 Sodium Chloride  100 ml @ 


 10 mls/hr  CONTINUOUS


 IV  4/27/18 19:30


    4/28/18 06:16


 


 


  (SandoSTATIN


 INJ)  50 mcg  ONCE


 IV PUSH  4/27/18 19:30


    4/27/18 19:53


 


 


 Sodium Chloride  1,000 ml @ 


 100 mls/hr  Q10H


 IV  4/27/18 21:14


    4/28/18 08:48


 


 


  (NS Flush)  2 ml  UNSCH  PRN


 IV FLUSH  4/27/18 21:15


     


 


 


  (NS Flush)  2 ml  BID


 IV FLUSH  4/28/18 09:00


     


 


 


  (Zofran Inj)  4 mg  Q6H  PRN


 IVP  4/27/18 21:15


     


 


 


  (Tylenol)  650 mg  Q6H  PRN


 PO  4/27/18 21:15


     


 


 


  (Norco  5-325 Mg)  1 tab  Q4H  PRN


 PO  4/27/18 21:15


     


 


 


  (Morphine Inj)  2 mg  Q3H  PRN


 IV PUSH  4/27/18 21:15


    4/28/18 04:29


 


 


  (Ilsa-Colace)  1 tab  BID


 PO  4/28/18 09:00


    4/28/18 08:49


 


 


  (Milk Of


 Magnesia Liq)  30 ml  Q12H  PRN


 PO  4/27/18 21:15


     


 


 


  (Senokot)  17.2 mg  Q12H  PRN


 PO  4/27/18 21:15


     


 


 


  (Dulcolax Supp)  10 mg  DAILY  PRN


 RECTAL  4/27/18 21:15


     


 


 


  (Lactulose Liq)  30 ml  DAILY  PRN


 PO  4/27/18 21:15


     


 


 


  (Abilify)  2 mg  DAILY@0600


 PO  4/28/18 06:00


    4/28/18 06:16


 


 


  (Namenda)  10 mg  BID


 PO  4/28/18 09:00


    4/28/18 08:48


 


 


  (Effexor Xr)  150 mg  HS


 PO  4/28/18 21:00


     


 


 


  (Aricept)  10 mg  HS


 PO  4/28/18 21:00


     


 











A/P


Problem List:  


(1) GI bleed


ICD Code:  K92.2 - Gastrointestinal hemorrhage, unspecified


(2) HARRY (acute kidney injury)


ICD Code:  N17.9 - Acute kidney failure, unspecified


(3) Dementia


ICD Code:  F03.90 - Unspecified dementia without behavioral disturbance


Status:  Chronic


Assessment and Plan


GI Bleed: report of hematemesis from SNF and 2 episodes of hematemesis overnight


Patient given Octreotide 50mcg x 1 in ED


h/o Esophageal/Gastric CA s/p Gastrectomy per records


   -GI following, patient NPO, plan for panendoscopy later today. 


   -continue on Protonix gtt


   -IVF





Anemia, secondary to GIB 


   -hgb dropped from 12.6 to 10.7 overnight


   -obtain stat H/H now.  Continue to follow H/H closely.


   -Transfuse as needed for Hgb <7 or if hemodynamically stable.  


   -continue to monitor on cardiac telemetry





HARRY on CKD:  Creatinine 1.92, previously 1.73 on 12/2/2017, suspect secondary 

to dehydration, BUN 22, improving on IVF


UA unremarkable


   -Continue on IVF for hydration


   -avoid nephrotoxic agents


   -continue to monitor kidney function as indicated.  Repeat BMP in am.





Dementia:  Severe.  At baseline. 


   -continue on home Aricept, Namenda, Abilify





DVT Prophylaxis:  Pharmacologic contraindication secondary to GI Bleed


   -bilateral SCD/JIGNA hose


Discharge Planning


Patient not ready for discharge.  Having endoscopy today.  Hgb dropping.  

Discharge pending clinical improvement and GI clearance











Maira Garcia Apr 28, 2018 13:54

## 2018-04-28 NOTE — PD.PROCEDR
GI Procedure


PROCEDURE PERFORMED


EGD with biopsy, bleeding control





INDICATION FOR PROCEDURE


Hematemesis, history of esophageal cancer in the past





PROCEDURE:


The procedure, risks and benefits were discussed with Mr. Bolaños and 

informed was obtained from his sister who is the power of 


consent was obtained.  Anesthesia sedated him with intubation to protect his 

airway.  He was placed in the left lateral decubitus position.





EGD:


The Pentax videoscope was introduced through the oropharynx and advanced to the 

second portion of the duodenum under direct visualization. Retroflexion was 

performed in the stomach.





ESTIMATED BLOOD LOSS:


none





SPECIMENS REMOVED:


distal esophagus





COMPLICATIONS:


none





IMPRESSION:


anatomy consistent with resection of the EG junction most likely


sever grade D esophagitis 


clot at an ulcer site in the esophagus injected with 3 cc of epinephrine, and 

cauterized with gold probe


Stomach was full of food, unable to see the whole mucosa


Duodenum normal





PLAN:


Clear liquid


No NSAIDs


Protonix 40 mg daily


Follow-up biopsy


Patient would need an endoscopy in 4-5 weeks with at least 2 days of clear 

liquid to evaluate the stomach and the healing of the esophagitis











Caity Maxwell MD Apr 28, 2018 15:58

## 2018-04-29 VITALS
TEMPERATURE: 98.5 F | DIASTOLIC BLOOD PRESSURE: 61 MMHG | HEART RATE: 63 BPM | OXYGEN SATURATION: 95 % | RESPIRATION RATE: 17 BRPM | SYSTOLIC BLOOD PRESSURE: 129 MMHG

## 2018-04-29 VITALS
DIASTOLIC BLOOD PRESSURE: 89 MMHG | OXYGEN SATURATION: 96 % | SYSTOLIC BLOOD PRESSURE: 182 MMHG | TEMPERATURE: 99.1 F | HEART RATE: 74 BPM | RESPIRATION RATE: 22 BRPM

## 2018-04-29 VITALS
HEART RATE: 68 BPM | SYSTOLIC BLOOD PRESSURE: 137 MMHG | OXYGEN SATURATION: 98 % | TEMPERATURE: 98.5 F | DIASTOLIC BLOOD PRESSURE: 81 MMHG | RESPIRATION RATE: 18 BRPM

## 2018-04-29 VITALS
SYSTOLIC BLOOD PRESSURE: 138 MMHG | RESPIRATION RATE: 16 BRPM | TEMPERATURE: 98.7 F | HEART RATE: 75 BPM | DIASTOLIC BLOOD PRESSURE: 75 MMHG | OXYGEN SATURATION: 95 %

## 2018-04-29 VITALS
TEMPERATURE: 98.5 F | HEART RATE: 78 BPM | SYSTOLIC BLOOD PRESSURE: 118 MMHG | DIASTOLIC BLOOD PRESSURE: 68 MMHG | OXYGEN SATURATION: 96 % | RESPIRATION RATE: 16 BRPM

## 2018-04-29 VITALS
HEART RATE: 68 BPM | RESPIRATION RATE: 18 BRPM | OXYGEN SATURATION: 100 % | DIASTOLIC BLOOD PRESSURE: 81 MMHG | SYSTOLIC BLOOD PRESSURE: 149 MMHG | TEMPERATURE: 98.2 F

## 2018-04-29 VITALS — HEART RATE: 62 BPM

## 2018-04-29 LAB
BASOPHILS # BLD AUTO: 0.1 TH/MM3 (ref 0–0.2)
BASOPHILS NFR BLD: 1.1 % (ref 0–2)
BUN SERPL-MCNC: 16 MG/DL (ref 7–18)
CALCIUM SERPL-MCNC: 8.4 MG/DL (ref 8.5–10.1)
CHLORIDE SERPL-SCNC: 110 MEQ/L (ref 98–107)
CREAT SERPL-MCNC: 1.47 MG/DL (ref 0.6–1.3)
EOSINOPHIL # BLD: 0.3 TH/MM3 (ref 0–0.4)
EOSINOPHIL NFR BLD: 5.3 % (ref 0–4)
ERYTHROCYTE [DISTWIDTH] IN BLOOD BY AUTOMATED COUNT: 17.9 % (ref 11.6–17.2)
GFR SERPLBLD BASED ON 1.73 SQ M-ARVRAT: 48 ML/MIN (ref 89–?)
GLUCOSE SERPL-MCNC: 86 MG/DL (ref 74–106)
HCO3 BLD-SCNC: 23.7 MEQ/L (ref 21–32)
HCT VFR BLD CALC: 33.5 % (ref 39–51)
HGB BLD-MCNC: 10.5 GM/DL (ref 13–17)
LYMPHOCYTES # BLD AUTO: 1.8 TH/MM3 (ref 1–4.8)
LYMPHOCYTES NFR BLD AUTO: 30.6 % (ref 9–44)
MCH RBC QN AUTO: 25.1 PG (ref 27–34)
MCHC RBC AUTO-ENTMCNC: 31.5 % (ref 32–36)
MCV RBC AUTO: 79.6 FL (ref 80–100)
MONOCYTE #: 0.9 TH/MM3 (ref 0–0.9)
MONOCYTES NFR BLD: 15.2 % (ref 0–8)
NEUTROPHILS # BLD AUTO: 2.7 TH/MM3 (ref 1.8–7.7)
NEUTROPHILS NFR BLD AUTO: 47.8 % (ref 16–70)
PLATELET # BLD: 350 TH/MM3 (ref 150–450)
PMV BLD AUTO: 7.4 FL (ref 7–11)
RBC # BLD AUTO: 4.21 MIL/MM3 (ref 4.5–5.9)
SODIUM SERPL-SCNC: 141 MEQ/L (ref 136–145)
WBC # BLD AUTO: 5.7 TH/MM3 (ref 4–11)

## 2018-04-29 RX ADMIN — STANDARDIZED SENNA CONCENTRATE AND DOCUSATE SODIUM SCH TAB: 8.6; 5 TABLET, FILM COATED ORAL at 20:18

## 2018-04-29 RX ADMIN — PANTOPRAZOLE SODIUM SCH MLS/HR: 40 INJECTION, POWDER, FOR SOLUTION INTRAVENOUS at 08:50

## 2018-04-29 RX ADMIN — Medication SCH ML: at 20:17

## 2018-04-29 RX ADMIN — ACETAMINOPHEN PRN MG: 325 TABLET ORAL at 20:21

## 2018-04-29 RX ADMIN — VENLAFAXINE HYDROCHLORIDE SCH MG: 75 CAPSULE, EXTENDED RELEASE ORAL at 20:18

## 2018-04-29 RX ADMIN — Medication SCH ML: at 09:00

## 2018-04-29 RX ADMIN — ACETAMINOPHEN PRN MG: 325 TABLET ORAL at 10:34

## 2018-04-29 RX ADMIN — PHENYTOIN SODIUM SCH MLS/HR: 50 INJECTION INTRAMUSCULAR; INTRAVENOUS at 08:50

## 2018-04-29 RX ADMIN — MEMANTINE HYDROCHLORIDE SCH MG: 10 TABLET ORAL at 20:18

## 2018-04-29 RX ADMIN — STANDARDIZED SENNA CONCENTRATE AND DOCUSATE SODIUM SCH TAB: 8.6; 5 TABLET, FILM COATED ORAL at 10:33

## 2018-04-29 RX ADMIN — MEMANTINE HYDROCHLORIDE SCH MG: 10 TABLET ORAL at 10:33

## 2018-04-29 RX ADMIN — DONEPEZIL HYDROCHLORIDE SCH MG: 5 TABLET, FILM COATED ORAL at 20:18

## 2018-04-29 NOTE — HHI.GIFU
Subjective


Remarks


Pt resting in bed.  Per RN he is quite happy with clear liquids.  No further 

bleeding.





Objective


Vitals I&O





Vital Signs








  Date Time  Temp Pulse Resp B/P (MAP) Pulse Ox O2 Delivery O2 Flow Rate FiO2


 


4/29/18 11:47 98.5 68 18 137/81 (99) 98   


 


4/29/18 08:26 98.7 75 16 138/75 (96) 95   


 


4/29/18 04:09 98.5 78 16 118/68 (85) 96   


 


4/29/18 00:32 98.5 63 17 129/61 (83) 95   


 


4/28/18 23:00  67      


 


4/28/18 20:24 98.6 61 16 119/60 (79) 96   





    Manual Cuff/Auscultation    


 


4/28/18 16:39 98.0 70 18 121/58 (79) 93   


 


4/28/18 16:00 98.5 78 14 130/69 (89) 95 Room Air  


 


4/28/18 15:45  84 14 141/72 (95) 100 Nasal Cannula 2 


 


4/28/18 15:39 98.5 88 14 144/69 (94) 99 Nasal Cannula 2 


 


4/28/18 12:44  71  145/70 (95)    














I/O      


 


 4/28/18 4/28/18 4/28/18 4/29/18 4/29/18 4/29/18





 07:00 15:00 23:00 07:00 15:00 23:00


 


Intake Total   200 ml  1100 ml 


 


Balance   200 ml  1100 ml 


 


      


 


Intake IV Total     1100 ml 


 


Other   200 ml   








Laboratory





Laboratory Tests








Test


  4/28/18


19:41 4/29/18


06:00


 


Hemoglobin 10.2  


 


Hematocrit 33.0  


 


Blood Urea Nitrogen  16 


 


Creatinine  1.47 


 


Random Glucose  86 


 


Calcium Level  8.4 


 


Sodium Level  141 


 


Potassium Level  4.3 


 


Chloride Level  110 


 


Carbon Dioxide Level  23.7 


 


Anion Gap  7 


 


Estimat Glomerular Filtration


Rate 


  48 


 








Imaging





Last Impressions








Chest X-Ray 4/27/18 1911 Signed





Impressions: 





 Service Date/Time:  Friday, April 27, 2018 19:16 - CONCLUSION:  No acute 





 abnormality demonstrated.     John Jules MD 








Physical Exam


HEENT:  normocephalic; atraumatic; no jaundice. 


CHEST:  CTA


CARDIAC:  RRR


ABDOMEN:  Soft, nondistended, epigastric TTP; no hepatosplenomegaly; bowel 

sounds are present in all four quadrants.


EXTREMITIES: No clubbing, cyanosis, or edema.


SKIN:  Normal; no rash; no jaundice.


CNS: Relatively nonverbal, oriented to self only





Assessment and Plan


Assessment:  


(1) GI bleed


ICD Codes:  K92.2 - Gastrointestinal hemorrhage, unspecified


Plan


Plan


Dysphasia, 68-year-old male with history of esophageal cancer and gastrectomy 

complicated with dementia here for hematemesis.  Currently patient has not been 

able to drink or eat for the past few weeks and does complain of nausea and 

vomiting and hematemesis.  started on Protonix drip current hemoglobin was 12.6 

on admission now 10.7.  Alkaline phosphatase was 192 now 155.  No family 

present.  No complaints of abdominal pain no rectal bleeding to his knowledge.





4/29/18 s/p EGD found grade D esophagitis, clot at ulcer site s/p epi inj, 

cautery, stomach full of blood. no HH today


   tolerating clears, no further bleeding.  indicates that he does have abd pain





PLAN


- stay on clears 2 days


- EGD after 2 days


- monitor HH


- 40mg protonix daily


- rck HH in am





Pt seen by myself and Dr Ho and this note is on his behalf











Lianet Watson Apr 29, 2018 12:02

## 2018-04-29 NOTE — HHI.PR
Subjective


Remarks


Follow up on patient with GIB.  Patient seen and examined.  Patient remains 

pleasantly confused.  He answers yes to all of my review of system questions.  

Discussed with nursing staff, no acute issues noted overnight.  No recurrence 

of hematemesis.  Patient has been tolerating clear diet well.  No fever chills.

  Urinating well.  No diarrhea.  Reportedly, GI wants patient to remain on 

clear liquid diet for 2 days before progressing and then close follow-up to see 

how patient tolerates.





Objective


Vitals





Vital Signs








  Date Time  Temp Pulse Resp B/P (MAP) Pulse Ox O2 Delivery O2 Flow Rate FiO2


 


4/29/18 08:26 98.7 75 16 138/75 (96) 95   


 


4/29/18 04:09 98.5 78 16 118/68 (85) 96   


 


4/29/18 00:32 98.5 63 17 129/61 (83) 95   


 


4/28/18 23:00  67      


 


4/28/18 20:24 98.6 61 16 119/60 (79) 96   





    Manual Cuff/Auscultation    


 


4/28/18 16:39 98.0 70 18 121/58 (79) 93   


 


4/28/18 16:00 98.5 78 14 130/69 (89) 95 Room Air  


 


4/28/18 15:45  84 14 141/72 (95) 100 Nasal Cannula 2 


 


4/28/18 15:39 98.5 88 14 144/69 (94) 99 Nasal Cannula 2 


 


4/28/18 12:44  71  145/70 (95)    


 


4/28/18 11:55 98.7 60 18 123/64 (83) 97   














I/O      


 


 4/28/18 4/28/18 4/28/18 4/29/18 4/29/18 4/29/18





 07:00 15:00 23:00 07:00 15:00 23:00


 


Intake Total   200 ml  1100 ml 


 


Balance   200 ml  1100 ml 


 


      


 


Intake IV Total     1100 ml 


 


Other   200 ml   








Result Diagram:  


4/28/18 1941 4/29/18 0600





Imaging





Last Impressions








Chest X-Ray 4/27/18 1911 Signed





Impressions: 





 Service Date/Time:  Friday, April 27, 2018 19:16 - CONCLUSION:  No acute 





 abnormality demonstrated.     John Jules MD 








Objective Remarks


GENERAL: WDWN pleasantly demented middle-aged white male patient, in no acute 

distress.  Awake and alert, sitting up in bed.  Pleasantly confused.  Krystal RN 

at the bedside.


HEENT: PERRLA, EOMI. No scleral icterus or conjunctival pallor. No lid lag or 

facial droop.  No nasal drainage or purulence.  MMM.


CARDIOVASCULAR: Regular rate and rhythm.  No obvious murmurs to auscultation. 

No chest tenderness to palpation. 


RESPIRATORY: Nonlabored.  No obvious rhonchi or wheezing. Clear to 

auscultation. Breath sounds equal bilaterally. 


GASTROINTESTINAL: Abdomen soft, non-tender, nondistended. BS normal. 


MUSCULOSKELETAL: Extremities without clubbing, cyanosis, or edema. No obvious 

deformities. 


NEUROLOGICAL: Awake, alert and oriented to self only, confused.  Moving both 

upper and lower extremities spontaneously.  No focal neurologic deficits.


PSYCHIATRIC:  Calm and pleasant.  Cooperative with exam.


Procedures


GI Procedure


PROCEDURE PERFORMED


EGD with biopsy, bleeding control





INDICATION FOR PROCEDURE


Hematemesis, history of esophageal cancer in the past





PROCEDURE:


The procedure, risks and benefits were discussed with Mr. Bolaños and 

informed was obtained from his sister who is the power of 


consent was obtained.  Anesthesia sedated him with intubation to protect his 

airway.  He was placed in the left lateral decubitus position.





EGD:


The Pentax videoscope was introduced through the oropharynx and advanced to the 

second portion of the duodenum under direct visualization. Retroflexion was 

performed in the stomach.





ESTIMATED BLOOD LOSS:


none





SPECIMENS REMOVED:


distal esophagus





COMPLICATIONS:


none





IMPRESSION:


anatomy consistent with resection of the EG junction most likely


sever grade D esophagitis 


clot at an ulcer site in the esophagus injected with 3 cc of epinephrine, and 

cauterized with gold probe


Stomach was full of food, unable to see the whole mucosa


Duodenum normal





PLAN:


Clear liquid


No NSAIDs


Protonix 40 mg daily


Follow-up biopsy


Patient would need an endoscopy in 4-5 weeks with at least 2 days of clear 

liquid to evaluate the stomach and the healing of the esophagitis


Medications and IVs





Current Medications








 Medications


  (Trade)  Dose


 Ordered  Sig/Mary


 Route  Start Time


 Stop Time Status Last Admin


 


 Pantoprazole


 Sodium 80 mg/


 Sodium Chloride  100 ml @ 


 10 mls/hr  CONTINUOUS


 IV  4/27/18 19:30


    4/29/18 08:50


 


 


  (SandoSTATIN


 INJ)  50 mcg  ONCE


 IV PUSH  4/27/18 19:30


    4/27/18 19:53


 


 


 Sodium Chloride  1,000 ml @ 


 100 mls/hr  Q10H


 IV  4/27/18 21:14


    4/29/18 08:50


 


 


  (NS Flush)  2 ml  UNSCH  PRN


 IV FLUSH  4/27/18 21:15


     


 


 


  (NS Flush)  2 ml  BID


 IV FLUSH  4/28/18 09:00


     


 


 


  (Zofran Inj)  4 mg  Q6H  PRN


 IVP  4/27/18 21:15


    4/28/18 18:34


 


 


  (Tylenol)  650 mg  Q6H  PRN


 PO  4/27/18 21:15


    4/29/18 10:34


 


 


  (Norco  5-325 Mg)  1 tab  Q4H  PRN


 PO  4/27/18 21:15


     


 


 


  (Morphine Inj)  2 mg  Q3H  PRN


 IV PUSH  4/27/18 21:15


    4/28/18 04:29


 


 


  (Ilsa-Colace)  1 tab  BID


 PO  4/28/18 09:00


    4/29/18 10:33


 


 


  (Milk Of


 Magnesia Liq)  30 ml  Q12H  PRN


 PO  4/27/18 21:15


     


 


 


  (Senokot)  17.2 mg  Q12H  PRN


 PO  4/27/18 21:15


     


 


 


  (Dulcolax Supp)  10 mg  DAILY  PRN


 RECTAL  4/27/18 21:15


     


 


 


  (Lactulose Liq)  30 ml  DAILY  PRN


 PO  4/27/18 21:15


     


 


 


  (Abilify)  2 mg  DAILY@0600


 PO  4/28/18 06:00


    4/29/18 05:23


 


 


  (Namenda)  10 mg  BID


 PO  4/28/18 09:00


    4/29/18 10:33


 


 


  (Effexor Xr)  150 mg  HS


 PO  4/28/18 21:00


    4/28/18 21:07


 


 


  (Aricept)  10 mg  HS


 PO  4/28/18 21:00


    4/28/18 21:07


 











A/P


Problem List:  


(1) GI bleed


ICD Code:  K92.2 - Gastrointestinal hemorrhage, unspecified


(2) HARRY (acute kidney injury)


ICD Code:  N17.9 - Acute kidney failure, unspecified


(3) Dementia


ICD Code:  F03.90 - Unspecified dementia without behavioral disturbance


Status:  Chronic


Assessment and Plan


GI Bleed: report of hematemesis from SNF and 2 episodes of hematemesis 

overnight following admission


Patient given Octreotide 50mcg x 1 in ED


h/o Esophageal/Gastric CA s/p Gastrectomy per records


s/p EGD by Dr. Maxwell 4/28 revealing severe grade D esophagitis, clot at the 

ulcer site in the esophagus and stomach full of food.  Recommendations for 

clear liquid diet 2 days, Protonix 40 mg daily and no NSAIDs   -GI       


   -Discontinue Protonix drip.  Change to Protonix 40 mg p.o. daily


   -Avoid NSAIDs


   -Continue clear liquid diet 2 days per GI, plan to advance tomorrow and 

monitor to see how patient tolerates





Anemia, secondary to GIB 


hgb dropped from 12.6 to 10.7 overnight.  H/H stable at 10.2.


   -Continue to follow H&H, repeat CBC in a.m.


   -Transfuse as needed for Hgb <7 or if hemodynamically stable.  


   -continue to monitor on cardiac telemetry





HARRY on CKD:  Creatinine 1.92, previously 1.73 on 12/2/2017, suspect secondary 

to dehydration, BUN 22, much improved status post IVF


UA unremarkable


   -avoid nephrotoxic agents


   -continue to monitor kidney function as indicated.  





Dementia:  Severe.  At baseline. 


   -continue on home Aricept, Namenda, Abilify





DVT Prophylaxis:  Pharmacologic contraindication secondary to GI Bleed


   -bilateral SCD/JIGNA hose


Discharge Planning


Patient not ready for discharge.  Per GI, patient to continue on clear liquid 

diet 2 days with close monitoring to see how patient tolerates advance diet.  

Discharge pending GI clearance.











Maira Garcia Apr 29, 2018 11:57

## 2018-04-30 VITALS
TEMPERATURE: 98.4 F | SYSTOLIC BLOOD PRESSURE: 100 MMHG | DIASTOLIC BLOOD PRESSURE: 62 MMHG | RESPIRATION RATE: 16 BRPM | HEART RATE: 75 BPM | OXYGEN SATURATION: 99 %

## 2018-04-30 VITALS
HEART RATE: 74 BPM | SYSTOLIC BLOOD PRESSURE: 120 MMHG | RESPIRATION RATE: 16 BRPM | DIASTOLIC BLOOD PRESSURE: 72 MMHG | TEMPERATURE: 98.3 F | OXYGEN SATURATION: 96 %

## 2018-04-30 VITALS
DIASTOLIC BLOOD PRESSURE: 84 MMHG | RESPIRATION RATE: 16 BRPM | HEART RATE: 72 BPM | TEMPERATURE: 98.1 F | OXYGEN SATURATION: 97 % | SYSTOLIC BLOOD PRESSURE: 155 MMHG

## 2018-04-30 VITALS — HEART RATE: 85 BPM

## 2018-04-30 VITALS — HEART RATE: 73 BPM

## 2018-04-30 VITALS
SYSTOLIC BLOOD PRESSURE: 172 MMHG | TEMPERATURE: 98.2 F | HEART RATE: 74 BPM | OXYGEN SATURATION: 96 % | DIASTOLIC BLOOD PRESSURE: 92 MMHG | RESPIRATION RATE: 15 BRPM

## 2018-04-30 VITALS
RESPIRATION RATE: 16 BRPM | DIASTOLIC BLOOD PRESSURE: 53 MMHG | OXYGEN SATURATION: 98 % | TEMPERATURE: 98.3 F | SYSTOLIC BLOOD PRESSURE: 91 MMHG | HEART RATE: 88 BPM

## 2018-04-30 VITALS
SYSTOLIC BLOOD PRESSURE: 180 MMHG | TEMPERATURE: 98.2 F | RESPIRATION RATE: 24 BRPM | DIASTOLIC BLOOD PRESSURE: 91 MMHG | HEART RATE: 77 BPM | OXYGEN SATURATION: 95 %

## 2018-04-30 VITALS
RESPIRATION RATE: 16 BRPM | DIASTOLIC BLOOD PRESSURE: 94 MMHG | TEMPERATURE: 98 F | HEART RATE: 82 BPM | OXYGEN SATURATION: 99 % | SYSTOLIC BLOOD PRESSURE: 167 MMHG

## 2018-04-30 VITALS — HEART RATE: 75 BPM

## 2018-04-30 VITALS
SYSTOLIC BLOOD PRESSURE: 119 MMHG | TEMPERATURE: 98.2 F | DIASTOLIC BLOOD PRESSURE: 67 MMHG | HEART RATE: 79 BPM | RESPIRATION RATE: 16 BRPM | OXYGEN SATURATION: 97 %

## 2018-04-30 VITALS — HEART RATE: 89 BPM

## 2018-04-30 VITALS — HEART RATE: 64 BPM

## 2018-04-30 LAB
% SATURATION IRON PROFILE: 3.9 % (ref 20–50)
BASOPHILS # BLD AUTO: 0.2 TH/MM3 (ref 0–0.2)
BASOPHILS NFR BLD: 2.6 % (ref 0–2)
EOSINOPHIL # BLD: 0.4 TH/MM3 (ref 0–0.4)
EOSINOPHIL NFR BLD: 6.2 % (ref 0–4)
ERYTHROCYTE [DISTWIDTH] IN BLOOD BY AUTOMATED COUNT: 17.8 % (ref 11.6–17.2)
FERRITIN SERPL-MCNC: 5 NG/ML (ref 26–388)
FOLATE SERPL-MCNC: (no result) NG/ML (ref 3.1–17.5)
HCT VFR BLD CALC: 31.5 % (ref 39–51)
HCT VFR BLD CALC: 31.8 % (ref 39–51)
HGB BLD-MCNC: 10 GM/DL (ref 13–17)
HGB BLD-MCNC: 10.2 GM/DL (ref 13–17)
IRON (FE): 15 MCG/DL (ref 65–175)
LYMPHOCYTES # BLD AUTO: 1.5 TH/MM3 (ref 1–4.8)
LYMPHOCYTES NFR BLD AUTO: 25 % (ref 9–44)
MCH RBC QN AUTO: 25.4 PG (ref 27–34)
MCHC RBC AUTO-ENTMCNC: 31.8 % (ref 32–36)
MCV RBC AUTO: 79.8 FL (ref 80–100)
MONOCYTE #: 0.9 TH/MM3 (ref 0–0.9)
MONOCYTES NFR BLD: 14.9 % (ref 0–8)
NEUTROPHILS # BLD AUTO: 3.2 TH/MM3 (ref 1.8–7.7)
NEUTROPHILS NFR BLD AUTO: 51.3 % (ref 16–70)
PLATELET # BLD: 338 TH/MM3 (ref 150–450)
PMV BLD AUTO: 7 FL (ref 7–11)
RBC # BLD AUTO: 3.94 MIL/MM3 (ref 4.5–5.9)
TIBC SERPL-MCNC: 384 MCG/DL (ref 250–450)
VIT B12 SERPL-MCNC: 385 PG/ML (ref 193–986)
WBC # BLD AUTO: 6.2 TH/MM3 (ref 4–11)

## 2018-04-30 RX ADMIN — Medication SCH ML: at 22:05

## 2018-04-30 RX ADMIN — PANTOPRAZOLE SCH MG: 40 TABLET, DELAYED RELEASE ORAL at 10:05

## 2018-04-30 RX ADMIN — MEMANTINE HYDROCHLORIDE SCH MG: 10 TABLET ORAL at 10:05

## 2018-04-30 RX ADMIN — MEMANTINE HYDROCHLORIDE SCH MG: 10 TABLET ORAL at 22:05

## 2018-04-30 RX ADMIN — STANDARDIZED SENNA CONCENTRATE AND DOCUSATE SODIUM SCH TAB: 8.6; 5 TABLET, FILM COATED ORAL at 21:00

## 2018-04-30 RX ADMIN — FERROUS SULFATE TAB 325 MG (65 MG ELEMENTAL FE) SCH MG: 325 (65 FE) TAB at 22:04

## 2018-04-30 RX ADMIN — Medication SCH ML: at 10:05

## 2018-04-30 RX ADMIN — VENLAFAXINE HYDROCHLORIDE SCH MG: 75 CAPSULE, EXTENDED RELEASE ORAL at 22:04

## 2018-04-30 RX ADMIN — STANDARDIZED SENNA CONCENTRATE AND DOCUSATE SODIUM SCH TAB: 8.6; 5 TABLET, FILM COATED ORAL at 09:00

## 2018-04-30 RX ADMIN — DONEPEZIL HYDROCHLORIDE SCH MG: 5 TABLET, FILM COATED ORAL at 22:05

## 2018-04-30 NOTE — RADRPT
EXAM DATE/TIME:  04/30/2018 11:16 

 

[facility] COMPARISON:     

No previous studies available for comparison.

        

 

 

INDICATIONS :                

Bilateral leg pain. 

            

 

MEDICAL HISTORY :     

Gastroesophageal reflux disease.   Dementia. 

 

SURGICAL HISTORY :      

Right hip replacement. Gastric surgery. 

 

ENCOUNTER:     

Initial

 

ACUITY:     

1 day

 

PAIN SCORE:      

0/10

 

LOCATION:      

Bilateral  legs. 

                       

 

TECHNIQUE:     

Venous ultrasound of the left and right leg was performed from the inguinal ligament to the proximal 
calf.  Real-time, color Doppler and spectral tracing, compression and augmentation techniques were us
ed.  

 

FINDINGS:     

 

RIGHT LEG:     

There is normal compressibility of the deep venous system from the inguinal region to the proximal ca
lf.  No echogenic clot is seen in the lumen of the common femoral, femoral, popliteal, and posterior 
tibial veins.  There is a normal response of the venous system to proximal and distal augmentation an
d respiration.  

 

LEFT LEG:     

There is normal compressibility of the deep venous system from the inguinal region to the proximal ca
lf.  No echogenic clot is seen in the lumen of the common femoral, femoral, popliteal, and posterior 
tibial veins.  There is a normal response of the venous system to proximal and distal augmentation an
d respiration.  

 

CONCLUSION:     

The study is negative for deep venous thrombosis bilateral lower extremity.

 

 

 

 Wilman Shane MD on April 30, 2018 at 11:36           

Board Certified Radiologist.

 This report was verified electronically.

## 2018-04-30 NOTE — HHI.PR
Subjective


Remarks


Follow up on patient with GIB.  Patient seen and examined.  Patient reports 

tightness and swelling in bilateral calves.  He is any fever or chills.  Denies 

any chest pain or shortness of breath.  Denies any nausea, vomiting or 

abdominal pain.  Tolerating his clear liquid diet.  Discussed with nursing staff

, patient had 2 nonbloody BMs today.





Objective


Vitals





Vital Signs








  Date Time  Temp Pulse Resp B/P (MAP) Pulse Ox O2 Delivery O2 Flow Rate FiO2


 


4/30/18 11:09 98.0 82 16 167/94 (118) 99   


 


4/30/18 07:24 98.1 72 16 155/84 (107) 97   


 


4/30/18 03:59  64      


 


4/30/18 00:54  89      


 


4/30/18 00:53 98.2 74 15 172/92 (118) 96   


 


4/30/18 00:00  73      


 


4/29/18 21:30   16     


 


4/29/18 20:06 99.1 74 22 182/89 (120) 96   


 


4/29/18 15:15 98.2 68 18 149/81 (103) 100   


 


4/29/18 11:47 98.5 68 18 137/81 (99) 98   














I/O      


 


 4/29/18 4/29/18 4/29/18 4/30/18 4/30/18 4/30/18





 07:00 15:00 23:00 07:00 15:00 23:00


 


Intake Total  1485 ml    


 


Balance  1485 ml    


 


      


 


Intake IV Total  1485 ml    


 


# Voids  1 1   


 


# Bowel Movements  1 1   








Result Diagram:  


4/30/18 0409                                                                   

             4/29/18 0600





Imaging





Last Impressions








Chest X-Ray 4/27/18 1911 Signed





Impressions: 





 Service Date/Time:  Friday, April 27, 2018 19:16 - CONCLUSION:  No acute 





 abnormality demonstrated.     John Jules MD 








Objective Remarks


GENERAL: WDWN pleasantly demented middle-aged white male patient, in no acute 

distress.  Awake and alert, lying in bed.  Pleasantly confused.  


HEENT: PERRLA, EOMI. No scleral icterus or conjunctival pallor. No lid lag or 

facial droop.  No nasal drainage or purulence.  MMM.


CARDIOVASCULAR: Regular rate and rhythm.  No obvious murmurs to auscultation. 

No chest tenderness to palpation. 


RESPIRATORY: Nonlabored.  No obvious rhonchi or wheezing. Clear to 

auscultation. Breath sounds equal bilaterally. 


GASTROINTESTINAL: Abdomen soft, non-tender, nondistended. BS normal. 


MUSCULOSKELETAL: Extremities without clubbing or cyanosis. Right leg noticeably 

larger than left.  Patient complains of tenderness to palpation bilateral 

calves.  Both calves slightly firm R>L.


NEUROLOGICAL: Awake, alert and oriented to self only, confused.  Moving both 

upper and lower extremities spontaneously.  No focal neurologic deficits.


PSYCHIATRIC:  Calm and pleasant.  Cooperative with exam.


Procedures


GI Procedure


PROCEDURE PERFORMED


EGD with biopsy, bleeding control





INDICATION FOR PROCEDURE


Hematemesis, history of esophageal cancer in the past





PROCEDURE:


The procedure, risks and benefits were discussed with Mr. Bolaños and 

informed was obtained from his sister who is the power of 


consent was obtained.  Anesthesia sedated him with intubation to protect his 

airway.  He was placed in the left lateral decubitus position.





EGD:


The Pentax videoscope was introduced through the oropharynx and advanced to the 

second portion of the duodenum under direct visualization. Retroflexion was 

performed in the stomach.





ESTIMATED BLOOD LOSS:


none





SPECIMENS REMOVED:


distal esophagus





COMPLICATIONS:


none





IMPRESSION:


anatomy consistent with resection of the EG junction most likely


sever grade D esophagitis 


clot at an ulcer site in the esophagus injected with 3 cc of epinephrine, and 

cauterized with gold probe


Stomach was full of food, unable to see the whole mucosa


Duodenum normal





PLAN:


Clear liquid


No NSAIDs


Protonix 40 mg daily


Follow-up biopsy


Patient would need an endoscopy in 4-5 weeks with at least 2 days of clear 

liquid to evaluate the stomach and the healing of the esophagitis


Medications and IVs





Current Medications








 Medications


  (Trade)  Dose


 Ordered  Sig/Mary


 Route  Start Time


 Stop Time Status Last Admin


 


  (SandoSTATIN


 INJ)  50 mcg  ONCE


 IV PUSH  4/27/18 19:30


    4/27/18 19:53


 


 


  (NS Flush)  2 ml  UNSCH  PRN


 IV FLUSH  4/27/18 21:15


     


 


 


  (NS Flush)  2 ml  BID


 IV FLUSH  4/28/18 09:00


    4/30/18 10:05


 


 


  (Zofran Inj)  4 mg  Q6H  PRN


 IVP  4/27/18 21:15


    4/28/18 18:34


 


 


  (Tylenol)  650 mg  Q6H  PRN


 PO  4/27/18 21:15


    4/29/18 20:21


 


 


  (Norco  5-325 Mg)  1 tab  Q4H  PRN


 PO  4/27/18 21:15


     


 


 


  (Morphine Inj)  2 mg  Q3H  PRN


 IV PUSH  4/27/18 21:15


    4/28/18 04:29


 


 


  (Ilsa-Colace)  1 tab  BID


 PO  4/28/18 09:00


    4/29/18 20:18


 


 


  (Milk Of


 Magnesia Liq)  30 ml  Q12H  PRN


 PO  4/27/18 21:15


     


 


 


  (Senokot)  17.2 mg  Q12H  PRN


 PO  4/27/18 21:15


     


 


 


  (Dulcolax Supp)  10 mg  DAILY  PRN


 RECTAL  4/27/18 21:15


     


 


 


  (Lactulose Liq)  30 ml  DAILY  PRN


 PO  4/27/18 21:15


     


 


 


  (Abilify)  2 mg  DAILY@0600


 PO  4/28/18 06:00


    4/30/18 05:27


 


 


  (Namenda)  10 mg  BID


 PO  4/28/18 09:00


    4/30/18 10:05


 


 


  (Effexor Xr)  150 mg  HS


 PO  4/28/18 21:00


    4/29/18 20:18


 


 


  (Aricept)  10 mg  HS


 PO  4/28/18 21:00


    4/29/18 20:18


 


 


  (Protonix)  40 mg  DAILY


 PO  4/30/18 09:00


    4/30/18 10:05


 


 


  (Norvasc)  5 mg  DAILY


 PO  5/1/18 09:00


     


 











A/P


Problem List:  


(1) GI bleed


ICD Code:  K92.2 - Gastrointestinal hemorrhage, unspecified


(2) HARRY (acute kidney injury)


ICD Code:  N17.9 - Acute kidney failure, unspecified


(3) Dementia


ICD Code:  F03.90 - Unspecified dementia without behavioral disturbance


Status:  Chronic


Assessment and Plan


GI Bleed: report of hematemesis from SNF and 2 episodes of hematemesis 

overnight following admission


Patient given Octreotide 50mcg x 1 in ED


h/o Esophageal/Gastric CA s/p Gastrectomy per records


GI following: s/p EGD by Dr. Maxwell 4/28 revealing severe grade D esophagitis

, clot at the ulcer site in the esophagus and stomach full of food.  

Recommendations for clear liquid diet 2 days, Protonix 40 mg daily and no 

NSAIDs         


   -continue on Protonix 40 mg p.o. daily


   -Avoid NSAIDs


   -Continue clear liquid diet 2 days per GI.  Plan for repeat EGD later today.





Anemia, secondary to GIB 


ARNOLDO, iron 15, % sat 3.9, ferritin 5, TIBC 384


hgb dropped from 12.6 to 10.7 overnight.  H/H appears stable.


   -Continue to follow H&H as indicated.   Monitor for any recurrence of 

bleeding.


   -start on po ferrous sulfate BID, IV Venofer x 1 dose


   -Transfuse as needed for Hgb <7 or if hemodynamically stable.  


   -continue to monitor on cardiac telemetry





Hypertensive


No reported history of hypertension


   -start on Norvasc 5mg daily


   -Continue to monitor BP and adjust treatment accordingly





BLE swelling and tightness, R>L


Concern for DVT


   -obtain STAT doppler BLEs to r/o DVT





HARRY on CKD:  Creatinine 1.92, previously 1.73 on 12/2/2017, suspect secondary 

to dehydration, BUN 22, much improved status post IVF


UA unremarkable


   -avoid nephrotoxic agents


   -continue to monitor kidney function as indicated.  





Dementia:  Severe.  At baseline. 


   -continue on home Aricept, Namenda, Abilify





DVT Prophylaxis:  Pharmacologic contraindication secondary to GI Bleed


   -bilateral SCD/JIGNA hose


Discharge Planning


Patient not ready for discharge.  Per GI, patient to continue on clear liquid 

diet 2 days followed by repeat EGD later today.  Discharge pending GI 

clearance.











Maira Garcia Apr 30, 2018 11:31

## 2018-04-30 NOTE — HHI.GIFU
Subjective


Remarks


Pt resting in bed, napping.  Wants to go home.  Denies abd pain.  


 (Lianet Watson)





Objective


Vitals I&O





Vital Signs








  Date Time  Temp Pulse Resp B/P (MAP) Pulse Ox O2 Delivery O2 Flow Rate FiO2


 


4/30/18 07:24 98.1 72 16 155/84 (107) 97   


 


4/30/18 03:59  64      


 


4/30/18 00:54  89      


 


4/30/18 00:53 98.2 74 15 172/92 (118) 96   


 


4/30/18 00:00  73      


 


4/29/18 21:30   16     


 


4/29/18 20:06 99.1 74 22 182/89 (120) 96   


 


4/29/18 15:15 98.2 68 18 149/81 (103) 100   


 


4/29/18 11:47 98.5 68 18 137/81 (99) 98   














I/O      


 


 4/29/18 4/29/18 4/29/18 4/30/18 4/30/18 4/30/18





 07:00 15:00 23:00 07:00 15:00 23:00


 


Intake Total  1485 ml    


 


Balance  1485 ml    


 


      


 


Intake IV Total  1485 ml    


 


# Voids  1 1   


 


# Bowel Movements  1 1   








Laboratory





Laboratory Tests








Test


  4/29/18


19:55 4/30/18


04:09


 


White Blood Count 5.7  6.2 


 


Red Blood Count 4.21  3.94 


 


Hemoglobin 10.5  10.0 


 


Hematocrit 33.5  31.5 


 


Mean Corpuscular Volume 79.6  79.8 


 


Mean Corpuscular Hemoglobin 25.1  25.4 


 


Mean Corpuscular Hemoglobin


Concent 31.5 


  31.8 


 


 


Red Cell Distribution Width 17.9  17.8 


 


Platelet Count 350  338 


 


Mean Platelet Volume 7.4  7.0 


 


Neutrophils (%) (Auto) 47.8  51.3 


 


Lymphocytes (%) (Auto) 30.6  25.0 


 


Monocytes (%) (Auto) 15.2  14.9 


 


Eosinophils (%) (Auto) 5.3  6.2 


 


Basophils (%) (Auto) 1.1  2.6 


 


Neutrophils # (Auto) 2.7  3.2 


 


Lymphocytes # (Auto) 1.8  1.5 


 


Monocytes # (Auto) 0.9  0.9 


 


Eosinophils # (Auto) 0.3  0.4 


 


Basophils # (Auto) 0.1  0.2 


 


CBC Comment DIFF FINAL  DIFF FINAL 


 


Differential Comment    








Imaging





Last Impressions








Chest X-Ray 4/27/18 1911 Signed





Impressions: 





 Service Date/Time:  Friday, April 27, 2018 19:16 - CONCLUSION:  No acute 





 abnormality demonstrated.     John Jules MD 








Physical Exam


HEENT:  normocephalic; atraumatic; no jaundice. 


CHEST:  CTA


CARDIAC:  RRR


ABDOMEN:  Soft, nondistended, epigastric TTP; no hepatosplenomegaly; bowel 

sounds are present in all four quadrants.


EXTREMITIES: No clubbing, cyanosis, or edema.


SKIN:  Normal; no rash; no jaundice.


CNS: Relatively nonverbal, oriented to self only


 (Lianet Watson)





Assessment and Plan


Assessment:  


(1) GI bleed


ICD Codes:  K92.2 - Gastrointestinal hemorrhage, unspecified


Plan


Plan


Dysphasia, 68-year-old male with history of esophageal cancer and gastrectomy 

complicated with dementia here for hematemesis.  Currently patient has not been 

able to drink or eat for the past few weeks and does complain of nausea and 

vomiting and hematemesis.  started on Protonix drip current hemoglobin was 12.6 

on admission now 10.7.  Alkaline phosphatase was 192 now 155.  No family 

present.  No complaints of abdominal pain no rectal bleeding to his knowledge.





4/29/18 s/p EGD found grade D esophagitis, clot at ulcer site s/p epi inj, 

cautery, stomach full of blood. no HH today


   tolerating clears, no further bleeding.  indicates that he does have abd pain


4/30/18  HH trending down.  no obvious bleeding.    tolerating clears.  d/w RN





PLAN


- EGD today


- NPO  


- obtain consent


- monitor HH


- 40mg protonix daily








Pt seen by myself and Dr Maxwell and this note is on his behalf


 (Lianet Watson)


Plan


Agree with above note, plan on doing endoscopy today to ensure that there is no 

abnormality in the stomach and that there is no active bleeding in the esophagus

, if this is normal patient can be discharged home


 (Caity Maxwell MD)











Lianet Watson Apr 30, 2018 09:28


Caity Mawxell MD Apr 30, 2018 15:47

## 2018-04-30 NOTE — PD.PROCEDR
GI Procedure


PROCEDURE PERFORMED


Upper endoscopy





INDICATION FOR PROCEDURE


Patient had GI bleed, his stomach was full of food 2 days ago, needs repeat 

upper endoscopy to evaluate the stomach





PROCEDURE:


The procedure, risks and benefits were discussed with Mr. Bolaños and informed


consent was obtained.  Anesthesia sedated him with Diprivan.  He was placed in 

the left lateral decubitus position.





EGD:


The Pentax videoscope was introduced through the oropharynx and advanced to the 

second portion of the duodenum under direct visualization. Retroflexion was 

performed in the stomach.





ESTIMATED BLOOD LOSS:


None





SPECIMENS REMOVED:


None





COMPLICATIONS:


None





IMPRESSION:


Severe grade D esophagitis with multiple ulcers, no sign of active bleeding


Large hiatal hernia


Stomach was empty no abnormality was seen





PLAN:


Okay to feed patient


Continue PPI


Follow-up biopsy











Caity Maxwell MD Apr 30, 2018 17:52

## 2018-05-01 VITALS
TEMPERATURE: 98.2 F | OXYGEN SATURATION: 95 % | HEART RATE: 99 BPM | SYSTOLIC BLOOD PRESSURE: 124 MMHG | DIASTOLIC BLOOD PRESSURE: 87 MMHG | RESPIRATION RATE: 18 BRPM

## 2018-05-01 VITALS
HEART RATE: 88 BPM | OXYGEN SATURATION: 94 % | SYSTOLIC BLOOD PRESSURE: 114 MMHG | DIASTOLIC BLOOD PRESSURE: 81 MMHG | RESPIRATION RATE: 18 BRPM | TEMPERATURE: 98.1 F

## 2018-05-01 VITALS
OXYGEN SATURATION: 95 % | HEART RATE: 83 BPM | RESPIRATION RATE: 16 BRPM | DIASTOLIC BLOOD PRESSURE: 80 MMHG | TEMPERATURE: 98.2 F | SYSTOLIC BLOOD PRESSURE: 122 MMHG

## 2018-05-01 VITALS — HEART RATE: 90 BPM

## 2018-05-01 VITALS — HEART RATE: 64 BPM

## 2018-05-01 RX ADMIN — FERROUS SULFATE TAB 325 MG (65 MG ELEMENTAL FE) SCH MG: 325 (65 FE) TAB at 09:06

## 2018-05-01 RX ADMIN — PANTOPRAZOLE SCH MG: 40 TABLET, DELAYED RELEASE ORAL at 09:07

## 2018-05-01 RX ADMIN — MEMANTINE HYDROCHLORIDE SCH MG: 10 TABLET ORAL at 09:06

## 2018-05-01 RX ADMIN — STANDARDIZED SENNA CONCENTRATE AND DOCUSATE SODIUM SCH TAB: 8.6; 5 TABLET, FILM COATED ORAL at 09:00

## 2018-05-01 RX ADMIN — Medication SCH ML: at 09:00

## 2018-05-01 NOTE — HHI.DCPOC
Discharge Care Plan


Diagnosis:  


(1) Esophagitis


(2) Anemia


(3) GI bleed


(4) Hematemesis


Goals to Promote Your Health


* To prevent worsening of your condition and complications


* To maintain your health at the optimal level


Directions to Meet Your Goals


*** Take your medications as prescribed


*** Follow your dietary instruction


*** Follow activity as directed








*** Keep your appointments as scheduled


*** Take your immunizations and boosters as scheduled


*** If your symptoms worsen call your PCP, if no PCP go to Urgent Care Center 

or Emergency Room***


*** Smoking is Dangerous to Your Health. Avoid second hand smoke***


***Call the 24-hour hour crisis hotline for domestic abuse at [phone]***











Yaa Yoo PA-C May 1, 2018 7:24 am

## 2018-05-01 NOTE — HHI.DS
__________________________________________________





Discharge Summary


Admission Date


Apr 27, 2018 at 9:20 pm


Discharge Date:  May 1, 2018


Admitting Diagnosis





Hematemesis





(1) GI bleed


ICD Code:  K92.2 - Gastrointestinal hemorrhage, unspecified


Diagnosis:  Principal





(2) HARRY (acute kidney injury)


ICD Code:  N17.9 - Acute kidney failure, unspecified


Diagnosis:  Secondary





(3) Dementia


ICD Code:  F03.90 - Unspecified dementia without behavioral disturbance


Diagnosis:  Secondary


Status:  Chronic


Procedures


4/28/18 - EGD: anatomy consistent with resection of the EG junction most 

likely. Severe grade D esophagitis. Clot at an ulcer site in the esophagus 

injected with 3 cc of epinephrine, and cauterized with gold probe. Stomach was 

full of food, unable to see the whole mucosa. Duodenum normal. Recc clear liquid

, no NSAIDs, Protonix 40 mg daily. Patient would need an endoscopy in 4-5 weeks 

with at least 2 days of clear liquid to evaluate the stomach and the healing of 

the esophagitis





4/30/18 - Repeat EGD: Severe grade D esophagitis with multiple ulcers, no sign 

of active bleeding. Large hiatal hernia. Stomach was empty no abnormality was 

seen. Recc: Okay to feed patient, Continue PPI, Follow-up biopsy.


Brief History - From Admission


This is a 68-year-old male with a PMH of HTN, h/o Esophageal/Gastric CA s/p 

Gastrectomy and Dementia who was sent to the ER from [facility] for 

hematemesis.  Pt unable to provide any history due to severe dementia.  Per 

report, pt vomited bright red emesis earlier this afternoon.  Pt without 

complaints of abdominal pain.  On arrival, /106, HR 82, O2 sat 99% RA, 

Afebrile.  Hemoglobin 12.6, previously 12.2 on 12/2/17.  Creatinine 1.92, 

previously 1.73 on 12/2/17.  INR 1.0.  CXR with no acute findings.  Started on 

Protonix gtt in ER.


CBC/BMP:  


4/30/18 1324                                                                   

             4/29/18 0600





Significant Findings





Laboratory Tests








Test


  4/28/18


19:41 4/29/18


06:00 4/29/18


19:55 4/30/18


04:09


 


Hemoglobin


  10.2 GM/DL


(13.0-17.0) 


  10.5 GM/DL


(13.0-17.0) 10.0 GM/DL


(13.0-17.0)


 


Hematocrit


  33.0 %


(39.0-51.0) 


  33.5 %


(39.0-51.0) 31.5 %


(39.0-51.0)


 


Creatinine


  


  1.47 MG/DL


(0.60-1.30) 


  


 


 


Calcium Level


  


  8.4 MG/DL


(8.5-10.1) 


  


 


 


Chloride Level


  


  110 MEQ/L


() 


  


 


 


Estimat Glomerular Filtration


Rate 


  48 ML/MIN


(>89) 


  


 


 


Red Blood Count


  


  


  4.21 MIL/MM3


(4.50-5.90) 3.94 MIL/MM3


(4.50-5.90)


 


Mean Corpuscular Volume


  


  


  79.6 FL


(80.0-100.0) 79.8 FL


(80.0-100.0)


 


Mean Corpuscular Hemoglobin


  


  


  25.1 PG


(27.0-34.0) 25.4 PG


(27.0-34.0)


 


Mean Corpuscular Hemoglobin


Concent 


  


  31.5 %


(32.0-36.0) 31.8 %


(32.0-36.0)


 


Red Cell Distribution Width


  


  


  17.9 %


(11.6-17.2) 17.8 %


(11.6-17.2)


 


Monocytes (%) (Auto)


  


  


  15.2 %


(0.0-8.0) 14.9 %


(0.0-8.0)


 


Eosinophils (%) (Auto)


  


  


  5.3 %


(0.0-4.0) 6.2 %


(0.0-4.0)


 


Basophils (%) (Auto)


  


  


  


  2.6 %


(0.0-2.0)


 


Test


  4/30/18


13:24 


  


  


 


 


Hemoglobin


  10.2 GM/DL


(13.0-17.0) 


  


  


 


 


Hematocrit


  31.8 %


(39.0-51.0) 


  


  


 


 


Iron Level


  15 MCG/DL


() 


  


  


 


 


Percent Iron Saturation 3.9 % (20-50)    


 


Ferritin


  5 NG/ML


() 


  


  


 


 


Folate


  GREATER THAN


20.0 NG/ML 


  


  


 








Imaging





Last Impressions








Lower Extremity Ultrasound 4/30/18 0000 Signed





Impressions: 





 Service Date/Time:  Monday, April 30, 2018 11:16 - CONCLUSION:  The study is 





 negative for deep venous thrombosis bilateral lower extremity.     Wilman Shane MD 


 


Chest X-Ray 4/27/18 1911 Signed





Impressions: 





 Service Date/Time:  Friday, April 27, 2018 19:16 - CONCLUSION:  No acute 





 abnormality demonstrated.     John Jules MD 








PE at Discharge


GENERAL: Well-nourished, well-developed pleasant demented male patient in NAD.


SKIN: Warm and dry. No rash.


HEENT:  Normocephalic. Atraumatic. Pupils equal and round.  Mucous membranes 

pink and moist.


CARDIOVASCULAR: Regular rate and rhythm.  No murmur appreciated. 


RESPIRATORY: No accessory muscle use. Clear to auscultation. Breath sounds 

equal bilaterally.  


GASTROINTESTINAL: Abdomen soft, non-tender, nondistended. Normoactive bowel 

sounds x4.


MUSCULOSKELETAL: No obvious deformities. Extremities without clubbing, cyanosis

, or edema. 


NEUROLOGICAL: Awake and alert. No obvious cranial nerve deficits.  Motor 

grossly within normal limits. Normal speech.


Pt update on day of discharge


Follow up for hematemesis. The patient reports no further nausea/vomiting or 

abdominal pain. He tolerated oral intake with soft foods last night. He denies 

any other medical complaints. He is looking forward to going home.


Hospital Course


GI Bleed: report of hematemesis from SNF and 2 episodes of hematemesis 

overnight following admission. Patient given Octreotide 50mcg x 1 in ED. H/o 

Esophageal/Gastric CA s/p Gastrectomy per records. GI following: s/p EGD by Dr. Maxwell 4/28 revealing severe grade D esophagitis, clot at the ulcer site in 

the esophagus and stomach full of food.  Recommendations for clear liquid diet 

2 days, Protonix 40 mg daily and no NSAIDs. Repeat EGD done 4/30 showed severe 

grade D esophagitis, large hiatal hernia, stomach empty with no abnormality. 

Patient cleared for discharge by GI. Given Rx for Protonix 40 mg p.o. daily. 

Stop NSAIDs. Diet advanced to soft, patient tolerated well. Symptoms resolved. 





Anemia, secondary to GIB with iron deficiency, iron 15, % sat 3.9, ferritin 5, 

TIBC 384. Hgb dropped from 12.6 to 10.7 overnight.  H/H appears stable. No any 

recurrence of bleeding. Given IV Venofer x1 and Started on po ferrous sulfate 

BID. No transfusion needed throughout admission.  Hgb stable at 10.7 --> 10.2 --

> 10.5 --> 10.0 --> 10.2. 





Hypertension: no reported history of hypertension. Started on Norvasc 5mg 

daily. BP much improved. 





BLE swelling and tightness, R>L, Concern for DVT: STAT doppler BLEs negative 

for DVT. 





HARRY on CKD:  Creatinine 1.92, previously 1.73 on 12/2/2017, suspect secondary 

to dehydration, BUN 22, much improved status post IVF. UA unremarkable. Avoid 

nephrotoxic agents. Renal function improved Cr 1.92 --> 1.60 --> 1.47. 





Dementia:  Severe.  At baseline. Continue on home Aricept, Namenda, Abilify





DVT Prophylaxis:  Pharmacologic contraindication secondary to GI Bleed. 

Bilateral SCD/JIGNA hose


Pt Condition on Discharge:  Stable


Discharge Disposition:  Discharge to SNF


Discharge Time:  > 30 minutes


Discharge Instructions


DIET: Follow Instructions for:  As Tolerated, No Restrictions, Soft Diet


Activities you can perform:  Regular-No Restrictions


Follow up Referrals:  


Gastroenterology - 1 Week with Caity Maxwell MD


PCP Follow-up - 1 Week with Clive Caldwell MD





New Medications:  


Amlodipine (Norvasc) 5 Mg Tab


5 MG PO DAILY for Blood Pressure Management, #30 TAB





Ferrous Sulfate (Ferosul) 325 Mg (65 Mg Iron) Tablet


325 MG PO BID for Build Red Blood Cells for 30 Days, #60 TAB





Pantoprazole (Pantoprazole) 40 Mg Tab


40 MG PO DAILY for esophagitis, #30 TAB





 


Continued Medications:  


Acetaminophen (Tylenol) 325 Mg Tab


650 MG PO Q4H PRN for PAIN, TAB 0 Refills





Aluminum-Magnesium-Simethicone Liq (Almacone Liq) 200-200-20 Mg/5 Ml Susp


15 ML PO Q4HR PRN for NAUSEA, ML 0 Refills


Take between meals or as directed. Shake well. Do not exceed 120 mL/24


 hrs.


Aripiprazole (Abilify) 2 Mg Tab


2 MG PO DAILY@0600, #30 TAB 3 Refills





Bisacodyl Supp (Bisac-Evac Supp) 10 Mg Supp


10 MG RECTAL DAILY PRN for SEVERE CONSITIPATION, #30 TAB





Cholecalciferol (Vitamin D3) 1,000 Unit Cap


1000 UNITS PO DAILY for Nutritional Supplement, #1 BOTTLE 0 Refills





Docusate Sodium (Colace) 100 Mg Capsule


100 MG PO BID for Prevent Constipation, #60 CAP 0 Refills





Donepezil HCl (Aricept) 10 Mg Tablet


10 MG PO HS for Dementia





Guaifenesin/Dextromethorphan (Robitussin Cough-Chest Dm Liq) 100 Mg-5 Mg/5 Ml 

Liquid


10 ML PO Q6HR PRN for COUGH





Magnesium Citrate Liq (Magnesium Citrate Liq) 300 Ml Liq


300 ML PO Q12HR PRN for CONSTIPATION, BOTTLE 0 Refills





Memantine (Namenda) 10 Mg Tab


10 MG PO BID for Alzheimer Disease, #60 TAB 3 Refills





Multiple Vitamins W/ Minerals (Multi-Vitamin/Minerals) 1 Tab Tab


1 TAB PO DAILY for Nutritional Supplement





Polyethylene Glycol 3350 Powder (Miralax Powder) 17 Gm Powd


17 GM PO DAILY for Constipation, #1 CAN 0 Refills


Mix and dissolve one measuring capful (17 grams) in 4oz of water or


 juice.


Sennosides (Senna-Tabs) 8.6 Mg Tab


8.6 MG PO BID for Constipation, #30 TAB 0 Refills





Venlafaxine ER 24 HR (Venlafaxine ER 24 HR) 150 Mg Cap


150 MG PO HS, #30 CAP 4 Refills





 


Discontinued Medications:  


Aspirin (Aspirin Low Dose) 81 Mg Chew


81 MG CHEW DAILY for Prevent Blood Clot, #30 TAB 0 Refills





Ibuprofen (Ibuprofen) 400 Mg Tab


400 MG PO Q8H PRN for PAIN, TAB 0 Refills

















Yaa Yoo PA-C May 1, 2018 08:21


Estrella Arellano DO May 1, 2018 18:19